# Patient Record
Sex: FEMALE | Race: WHITE | NOT HISPANIC OR LATINO | URBAN - METROPOLITAN AREA
[De-identification: names, ages, dates, MRNs, and addresses within clinical notes are randomized per-mention and may not be internally consistent; named-entity substitution may affect disease eponyms.]

---

## 2017-09-21 ENCOUNTER — OUTPATIENT (OUTPATIENT)
Dept: OUTPATIENT SERVICES | Facility: HOSPITAL | Age: 62
LOS: 1 days | Discharge: HOME | End: 2017-09-21

## 2017-09-21 DIAGNOSIS — G43.909 MIGRAINE, UNSPECIFIED, NOT INTRACTABLE, WITHOUT STATUS MIGRAINOSUS: ICD-10-CM

## 2017-09-21 DIAGNOSIS — F41.9 ANXIETY DISORDER, UNSPECIFIED: ICD-10-CM

## 2017-09-21 DIAGNOSIS — J44.9 CHRONIC OBSTRUCTIVE PULMONARY DISEASE, UNSPECIFIED: ICD-10-CM

## 2017-09-21 DIAGNOSIS — J30.9 ALLERGIC RHINITIS, UNSPECIFIED: ICD-10-CM

## 2017-09-21 DIAGNOSIS — K21.9 GASTRO-ESOPHAGEAL REFLUX DISEASE WITHOUT ESOPHAGITIS: ICD-10-CM

## 2017-11-15 ENCOUNTER — OUTPATIENT (OUTPATIENT)
Dept: OUTPATIENT SERVICES | Facility: HOSPITAL | Age: 62
LOS: 1 days | Discharge: HOME | End: 2017-11-15

## 2018-05-29 ENCOUNTER — OUTPATIENT (OUTPATIENT)
Dept: OUTPATIENT SERVICES | Facility: HOSPITAL | Age: 63
LOS: 1 days | Discharge: HOME | End: 2018-05-29

## 2018-05-29 DIAGNOSIS — M85.80 OTHER SPECIFIED DISORDERS OF BONE DENSITY AND STRUCTURE, UNSPECIFIED SITE: ICD-10-CM

## 2018-05-29 DIAGNOSIS — J30.9 ALLERGIC RHINITIS, UNSPECIFIED: ICD-10-CM

## 2018-05-29 DIAGNOSIS — J44.9 CHRONIC OBSTRUCTIVE PULMONARY DISEASE, UNSPECIFIED: ICD-10-CM

## 2018-05-29 DIAGNOSIS — K21.9 GASTRO-ESOPHAGEAL REFLUX DISEASE WITHOUT ESOPHAGITIS: ICD-10-CM

## 2019-06-12 ENCOUNTER — OUTPATIENT (OUTPATIENT)
Dept: OUTPATIENT SERVICES | Facility: HOSPITAL | Age: 64
LOS: 1 days | Discharge: HOME | End: 2019-06-12

## 2019-06-12 DIAGNOSIS — F43.23 ADJUSTMENT DISORDER WITH MIXED ANXIETY AND DEPRESSED MOOD: ICD-10-CM

## 2019-06-12 DIAGNOSIS — J30.9 ALLERGIC RHINITIS, UNSPECIFIED: ICD-10-CM

## 2019-06-12 DIAGNOSIS — J44.9 CHRONIC OBSTRUCTIVE PULMONARY DISEASE, UNSPECIFIED: ICD-10-CM

## 2019-06-12 DIAGNOSIS — G43.909 MIGRAINE, UNSPECIFIED, NOT INTRACTABLE, WITHOUT STATUS MIGRAINOSUS: ICD-10-CM

## 2019-06-12 DIAGNOSIS — K21.9 GASTRO-ESOPHAGEAL REFLUX DISEASE WITHOUT ESOPHAGITIS: ICD-10-CM

## 2021-04-15 PROBLEM — Z00.00 ENCOUNTER FOR PREVENTIVE HEALTH EXAMINATION: Status: ACTIVE | Noted: 2021-04-15

## 2021-05-11 ENCOUNTER — INPATIENT (INPATIENT)
Facility: HOSPITAL | Age: 66
LOS: 1 days | Discharge: HOME | End: 2021-05-13
Attending: STUDENT IN AN ORGANIZED HEALTH CARE EDUCATION/TRAINING PROGRAM | Admitting: STUDENT IN AN ORGANIZED HEALTH CARE EDUCATION/TRAINING PROGRAM
Payer: MEDICARE

## 2021-05-11 VITALS
SYSTOLIC BLOOD PRESSURE: 133 MMHG | DIASTOLIC BLOOD PRESSURE: 75 MMHG | HEIGHT: 67 IN | HEART RATE: 95 BPM | OXYGEN SATURATION: 96 % | RESPIRATION RATE: 18 BRPM | WEIGHT: 128.09 LBS | TEMPERATURE: 98 F

## 2021-05-11 LAB
ALBUMIN SERPL ELPH-MCNC: 4.4 G/DL — SIGNIFICANT CHANGE UP (ref 3.5–5.2)
ALP SERPL-CCNC: 159 U/L — HIGH (ref 30–115)
ALT FLD-CCNC: 15 U/L — SIGNIFICANT CHANGE UP (ref 0–41)
ANION GAP SERPL CALC-SCNC: 11 MMOL/L — SIGNIFICANT CHANGE UP (ref 7–14)
APTT BLD: 39 SEC — SIGNIFICANT CHANGE UP (ref 27–39.2)
AST SERPL-CCNC: 31 U/L — SIGNIFICANT CHANGE UP (ref 0–41)
BASOPHILS # BLD AUTO: 0.06 K/UL — SIGNIFICANT CHANGE UP (ref 0–0.2)
BASOPHILS NFR BLD AUTO: 0.6 % — SIGNIFICANT CHANGE UP (ref 0–1)
BILIRUB SERPL-MCNC: 0.2 MG/DL — SIGNIFICANT CHANGE UP (ref 0.2–1.2)
BUN SERPL-MCNC: 23 MG/DL — HIGH (ref 10–20)
CALCIUM SERPL-MCNC: 9.8 MG/DL — SIGNIFICANT CHANGE UP (ref 8.5–10.1)
CHLORIDE SERPL-SCNC: 102 MMOL/L — SIGNIFICANT CHANGE UP (ref 98–110)
CO2 SERPL-SCNC: 24 MMOL/L — SIGNIFICANT CHANGE UP (ref 17–32)
CREAT SERPL-MCNC: 1.3 MG/DL — SIGNIFICANT CHANGE UP (ref 0.7–1.5)
EOSINOPHIL # BLD AUTO: 0.12 K/UL — SIGNIFICANT CHANGE UP (ref 0–0.7)
EOSINOPHIL NFR BLD AUTO: 1.2 % — SIGNIFICANT CHANGE UP (ref 0–8)
GLUCOSE SERPL-MCNC: 98 MG/DL — SIGNIFICANT CHANGE UP (ref 70–99)
HCT VFR BLD CALC: 39.5 % — SIGNIFICANT CHANGE UP (ref 37–47)
HGB BLD-MCNC: 12.7 G/DL — SIGNIFICANT CHANGE UP (ref 12–16)
IMM GRANULOCYTES NFR BLD AUTO: 0.4 % — HIGH (ref 0.1–0.3)
INR BLD: 1.11 RATIO — SIGNIFICANT CHANGE UP (ref 0.65–1.3)
LYMPHOCYTES # BLD AUTO: 1.69 K/UL — SIGNIFICANT CHANGE UP (ref 1.2–3.4)
LYMPHOCYTES # BLD AUTO: 16.8 % — LOW (ref 20.5–51.1)
MCHC RBC-ENTMCNC: 27.8 PG — SIGNIFICANT CHANGE UP (ref 27–31)
MCHC RBC-ENTMCNC: 32.2 G/DL — SIGNIFICANT CHANGE UP (ref 32–37)
MCV RBC AUTO: 86.4 FL — SIGNIFICANT CHANGE UP (ref 81–99)
MONOCYTES # BLD AUTO: 0.92 K/UL — HIGH (ref 0.1–0.6)
MONOCYTES NFR BLD AUTO: 9.1 % — SIGNIFICANT CHANGE UP (ref 1.7–9.3)
NEUTROPHILS # BLD AUTO: 7.23 K/UL — HIGH (ref 1.4–6.5)
NEUTROPHILS NFR BLD AUTO: 71.9 % — SIGNIFICANT CHANGE UP (ref 42.2–75.2)
NRBC # BLD: 0 /100 WBCS — SIGNIFICANT CHANGE UP (ref 0–0)
PLATELET # BLD AUTO: 286 K/UL — SIGNIFICANT CHANGE UP (ref 130–400)
POTASSIUM SERPL-MCNC: 4.5 MMOL/L — SIGNIFICANT CHANGE UP (ref 3.5–5)
POTASSIUM SERPL-SCNC: 4.5 MMOL/L — SIGNIFICANT CHANGE UP (ref 3.5–5)
PROT SERPL-MCNC: 7.5 G/DL — SIGNIFICANT CHANGE UP (ref 6–8)
PROTHROM AB SERPL-ACNC: 12.8 SEC — SIGNIFICANT CHANGE UP (ref 9.95–12.87)
RAPID RVP RESULT: SIGNIFICANT CHANGE UP
RBC # BLD: 4.57 M/UL — SIGNIFICANT CHANGE UP (ref 4.2–5.4)
RBC # FLD: 14.7 % — HIGH (ref 11.5–14.5)
SARS-COV-2 RNA SPEC QL NAA+PROBE: SIGNIFICANT CHANGE UP
SODIUM SERPL-SCNC: 137 MMOL/L — SIGNIFICANT CHANGE UP (ref 135–146)
TROPONIN T SERPL-MCNC: <0.01 NG/ML — SIGNIFICANT CHANGE UP
WBC # BLD: 10.06 K/UL — SIGNIFICANT CHANGE UP (ref 4.8–10.8)
WBC # FLD AUTO: 10.06 K/UL — SIGNIFICANT CHANGE UP (ref 4.8–10.8)

## 2021-05-11 PROCEDURE — 70498 CT ANGIOGRAPHY NECK: CPT | Mod: 26,MA

## 2021-05-11 PROCEDURE — 70496 CT ANGIOGRAPHY HEAD: CPT | Mod: 26,MA

## 2021-05-11 PROCEDURE — 93010 ELECTROCARDIOGRAM REPORT: CPT

## 2021-05-11 PROCEDURE — 0042T: CPT

## 2021-05-11 PROCEDURE — 99221 1ST HOSP IP/OBS SF/LOW 40: CPT

## 2021-05-11 PROCEDURE — 99285 EMERGENCY DEPT VISIT HI MDM: CPT

## 2021-05-11 PROCEDURE — 99223 1ST HOSP IP/OBS HIGH 75: CPT

## 2021-05-11 PROCEDURE — 70450 CT HEAD/BRAIN W/O DYE: CPT | Mod: 26,MA,59

## 2021-05-11 RX ORDER — FLUTICASONE PROPIONATE 50 MCG
0 SPRAY, SUSPENSION NASAL
Qty: 0 | Refills: 0 | DISCHARGE

## 2021-05-11 RX ORDER — PANTOPRAZOLE SODIUM 20 MG/1
40 TABLET, DELAYED RELEASE ORAL
Refills: 0 | Status: DISCONTINUED | OUTPATIENT
Start: 2021-05-11 | End: 2021-05-13

## 2021-05-11 RX ORDER — FLUTICASONE PROPIONATE 50 MCG
2 SPRAY, SUSPENSION NASAL
Qty: 0 | Refills: 0 | DISCHARGE

## 2021-05-11 RX ORDER — DIPHENHYDRAMINE HCL 50 MG
50 CAPSULE ORAL ONCE
Refills: 0 | Status: COMPLETED | OUTPATIENT
Start: 2021-05-11 | End: 2021-05-11

## 2021-05-11 RX ORDER — NICOTINE POLACRILEX 2 MG
1 GUM BUCCAL DAILY
Refills: 0 | Status: DISCONTINUED | OUTPATIENT
Start: 2021-05-11 | End: 2021-05-13

## 2021-05-11 RX ORDER — ESOMEPRAZOLE MAGNESIUM 40 MG/1
1 CAPSULE, DELAYED RELEASE ORAL
Qty: 0 | Refills: 0 | DISCHARGE

## 2021-05-11 RX ORDER — ASPIRIN/CALCIUM CARB/MAGNESIUM 324 MG
1 TABLET ORAL
Qty: 0 | Refills: 0 | DISCHARGE

## 2021-05-11 RX ORDER — ALPRAZOLAM 0.25 MG
1 TABLET ORAL
Qty: 0 | Refills: 0 | DISCHARGE

## 2021-05-11 RX ORDER — ESOMEPRAZOLE MAGNESIUM 40 MG/1
0 CAPSULE, DELAYED RELEASE ORAL
Qty: 0 | Refills: 0 | DISCHARGE

## 2021-05-11 RX ORDER — CHLORHEXIDINE GLUCONATE 213 G/1000ML
1 SOLUTION TOPICAL
Refills: 0 | Status: DISCONTINUED | OUTPATIENT
Start: 2021-05-11 | End: 2021-05-13

## 2021-05-11 RX ORDER — ATORVASTATIN CALCIUM 80 MG/1
80 TABLET, FILM COATED ORAL AT BEDTIME
Refills: 0 | Status: DISCONTINUED | OUTPATIENT
Start: 2021-05-11 | End: 2021-05-13

## 2021-05-11 RX ORDER — NEFAZODONE HYDROCHLORIDE 200 MG/1
0 TABLET ORAL
Qty: 0 | Refills: 0 | DISCHARGE

## 2021-05-11 RX ORDER — ASPIRIN/CALCIUM CARB/MAGNESIUM 324 MG
81 TABLET ORAL DAILY
Refills: 0 | Status: DISCONTINUED | OUTPATIENT
Start: 2021-05-11 | End: 2021-05-13

## 2021-05-11 RX ORDER — ASPIRIN/CALCIUM CARB/MAGNESIUM 324 MG
81 TABLET ORAL DAILY
Refills: 0 | Status: DISCONTINUED | OUTPATIENT
Start: 2021-05-11 | End: 2021-05-12

## 2021-05-11 RX ORDER — ALPRAZOLAM 0.25 MG
0 TABLET ORAL
Qty: 0 | Refills: 0 | DISCHARGE

## 2021-05-11 RX ORDER — ALPRAZOLAM 0.25 MG
1 TABLET ORAL
Refills: 0 | Status: DISCONTINUED | OUTPATIENT
Start: 2021-05-11 | End: 2021-05-11

## 2021-05-11 RX ORDER — ALPRAZOLAM 0.25 MG
1 TABLET ORAL
Refills: 0 | Status: DISCONTINUED | OUTPATIENT
Start: 2021-05-11 | End: 2021-05-13

## 2021-05-11 RX ADMIN — Medication 125 MILLIGRAM(S): at 11:50

## 2021-05-11 RX ADMIN — Medication 1 MILLIGRAM(S): at 17:26

## 2021-05-11 RX ADMIN — Medication 1 MILLIGRAM(S): at 23:26

## 2021-05-11 RX ADMIN — Medication 50 MILLIGRAM(S): at 11:50

## 2021-05-11 NOTE — STROKE CODE NOTE - IV ALTEPLASE EXCLUSION ABS OTHER
Last time well unclear,  and patient report her not feeling well the previous day. Not a candidate for IV thrombolytics

## 2021-05-11 NOTE — H&P ADULT - ASSESSMENT
65 year old woman with PMH of COPD (not on any medication),Active smoker, Stable pulmonary nodules, Anxiety Depression, CVA 5 years ago residula LLE weakness,  presents with increased from residual left sided weakness slurred speech, sudden loss of coordination, fall x 2 episodes with head injury.  As per patient and family  she  recently found out that she  was being moved to another living location and became very depressed and felt the onset of an anxiety attach.  Patient  states that she then took and unspecified about of 1mg xanax pills. This  was followed  by  a sudden felling of lethargy.  She then tried t get out of bed felt really weak and unsteady.  She doesn't remember much after this point. When  her family found her she was slurring her words, very weak  and had a bruise on her head.    Stroke code on arrival. NIHSS 5 for generalized weakness and baseline left sided numbness. Patient Denies  Suicidal Ideation, chest  Pain , Palpitations.       IMPRESSION/Plan   Unwitnessed Fall  with head Trauma  in the setting on  Toxic Encephalopathy ( Xanax Overuse) Syncope   >Symtoms of lethargy and slurred speech are like side effects of medication over use  >Tapered  Xanax  to  1mg q12 instead of home doing of 1mg q4  > Patients States that she sees a Psyc Doctor as OP but did not tolerated  any other Anxiolytics  Well   > f/u Inpatient Psych  R/o Acute CVA  at baseline patient has  Residual  LLE Weakanes and Gait instability   > Possible component of strock  Unmasking in the  Setting of Xanax overdose   > CT imaging unremarkable for Acute Infarct    > f/u MRI and Neurology   > continue  Aspiring   > f/u  Lipid pnael   > EEG r/u  Seizures   > f/u echo   > ECG NSR  > further recs as per Neuro   > Pt/Rehab   HO COPD stable  with emphysematous changes not on CT imaging   > Patient has regular OP CT  screening scan for subcentimeter nodules   Active  Smoking   > Nicotine Patch   > Smoking cessation encouragement       Electrolyte Imbalances: Correct as needed  []  Hyponatremia   /   Hypernatremia  []   []  Hypokalemia   /   Hyperkalemia  []   []  Hypochlorhydria   /    Hypochlorhydria  []   []  Hypomagnesemia   /   Hypermagnesemia  []   []  Hypophosphatemia   /   Hyperphosphatemia  []       GI ppx:                                   [] Not indicated   /   [x] Pantoprazole 40mg PO Daily    DVT ppx:  [] Not indicated / [] Heparin 5000mg SubQ / [x] Lovenox 40mg SubQ / [] SCDs    Fluids:   [x] PO  |  [] IVF    Activity:  [X] Assisatnce needed   [X] Increase as Tolerated  /  [] OOB w/ assist  /  [] Bed Rest    BMI:      DISPO:  Patient to be discharged when condition(s) optimized.  [x ] From Home     [ ] NH/SNF   [ ] 4A Rehab  [ ] Detox Clinic  [X] Plan Discussion with patient and/or family.  [X] Discussed Case and Plan with the Medical Attending.    CODE STATUS  [X] FULL   /    [] DNR

## 2021-05-11 NOTE — H&P ADULT - NSHPREVIEWOFSYSTEMS_GEN_ALL_CORE
REVIEW OF SYSTEMS:  CONSTITUTIONAL: No fever, weight loss, or fatigue  EYES: No eye pain, visual disturbances, or discharge  ENMT:  No difficulty hearing, tinnitus, vertigo; No sinus or throat pain  RESPIRATORY: No cough, wheezing, chills or hemoptysis; No shortness of breath  CARDIOVASCULAR: No chest pain, palpitations, dizziness, or leg swelling  GASTROINTESTINAL: No abdominal or epigastric pain.No diarrhea or constipation.   GENITOURINARY: No dysuria, frequency, hematuria, or incontinence  NEUROLOGICAL: No headaches, memory loss, loss of strength, numbness, or tremors  MUSCULOSKELETAL: No joint pain or swelling; No muscle, back, or extremity pain  PSYCHIATRIC: No depression, anxiety, mood swings, or difficulty sleeping

## 2021-05-11 NOTE — ED PROVIDER NOTE - CLINICAL SUMMARY MEDICAL DECISION MAKING FREE TEXT BOX
65 y.o. female, PMH of COPD (not on any medication), Active smoker, Stable pulmonary nodules, Anxiety Depression, CVA 5 years ago residual LLE weakness, comes in c/o worsening left sided weakness, slurred speech, sudden loss of coordination, fall x 2 episodes with head injury. Patient states that her anxiety got worse and she took her Xanax, felt lethargic, tried to get out of bed, felt really weak and unsteady. She doesn't remember much after this point. When  her family found her she was slurring her words, was very weak  and had a bruise on her head. Stroke code called in triage. On exam, pt in NAD, AAOx3, slurred speech, head (+) abrasion to left forehead, PEERL, EOMi, CN II-XII intact, lungs CTA B/L, CV S1S2 regular, abdomen soft/NT/ND/(+)BS, ext (+) bruise to right knee, Motor 5/5, sensation intact except left UE/LE subjective numbness, finger to nose intact. Labs/CT done and reviewed. WIll admit.

## 2021-05-11 NOTE — H&P ADULT - ATTENDING COMMENTS
HPI:  65 year old woman with PMH of stroke presents with increased from residual left sided weakness, sudden loss of coordination, fall x 2 episodes with head injury. Stroke code on arrival. NIHSS 5 for generalized weakness and baseline left sided numbness. Not a candidate for IV thrombolytics for being out of the window. Last known well is not clear.         REVIEW OF SYSTEMS:  CONSTITUTIONAL:  No weakness, fevers, chills, night sweats, weight loss  EYES/ENT: No visual changes. No vertigo or dysphagia  NECK: No neck pain or stiffness  RESPIRATORY: No cough, wheezing, hemoptysis. No shortness of breath  CARDIOVASCULAR: No chest pain or palpitations. No lower extremity edema  GASTROINTESTINAL: No abdominal pain. No nausea, vomiting, diarrhea, or hematemesis  GENITOURINARY: No dysuria or hematuria   NEUROLOGICAL: as above   SKIN: No rashes or itching  HEMATOLOGIC: No easy bruising or prolonged bleeding.      PHYSICAL EXAM:  GENERAL: NAD, well-developed, Non-toxic, stated age   HEAD:  Atraumatic, Normocephalic  EYES: EOMI, Sclera White   NECK: Supple, No JVD  CHEST/LUNG: Clear to auscultation bilaterally; No wheezing, rhonchi, or crackles  HEART: Regular rate and rhythm; s1, s2, No murmurs, rubs, or gallops  ABDOMEN: Soft, Nontender, Nondistended; Bowel sounds present, No rebound or guarding noted   EXTREMITIES:  No lower extremity edema or calf tenderness to palpation.  No clubbing or cyanosis  PSYCH: AAOx3, pleasant, cooperative, not anxious  NEUROLOGY: A/O (MRC grade R/L) 4+/5 UE; 4+/5 LE.   SKIN: No rashes or lesions      ASSESSMENT AND PLAN:  S/P  Fall with head Trauma  2' Toxic Encephalopathy ( Xanax Overuse)   Tapered  Xanax  to  1mg q12 instead of home doing of 1mg q4    R/o Acute CVA  at baseline    CT imaging unremarkable for Acute Infarct     MRI and Neurology    f/u  Lipid pnael    EEG r/u  Seizures   f/u echo     further recs as per Neuro    Pt/Rehab       My note supersedes the residents in the event of a discrepancy.

## 2021-05-11 NOTE — ED ADULT TRIAGE NOTE - CHIEF COMPLAINT QUOTE
c/o increased left sided weakness, sudden loss of coordination, fall x 2 episode, +head injury, denies LOC/blood thinners, abrasion to left forehead, and slurred speech, FS 96 in triage

## 2021-05-11 NOTE — ED PROVIDER NOTE - NS ED ROS FT
Eyes:  No visual changes, eye pain or discharge.  ENMT:  No hearing changes, pain, discharge or infections. No neck pain or stiffness.  Cardiac:  No chest pain, SOB or edema. No chest pain with exertion.  Respiratory:  No cough or respiratory distress. No hemoptysis. No history of asthma or RAD.  GI:  No nausea, vomiting, diarrhea or abdominal pain.  :  No dysuria, frequency or burning.  MS:  No myalgia, muscle weakness, joint pain or back pain.  Neuro:  No headache. +weakness. No LOC.  Skin:  No skin rash.

## 2021-05-11 NOTE — ED ADULT NURSE REASSESSMENT NOTE - NS ED NURSE REASSESS COMMENT FT1
Pt reassessed. NIH 4. Pt more alert and less dysarthric. VSS. Cardiac monitoring continued. brother at bedside. will continue to monitor.

## 2021-05-11 NOTE — CONSULT NOTE ADULT - ASSESSMENT
Impression:  65 year old woman with PMH of stroke presents with increased from residual left sided weakness, sudden loss of coordination, fall x 2 episodes with head injury. Stroke code on arrival. NIHSS 5 for generalized weakness and baseline left sided numbness. Not a candidate for IV thrombolytics for being out of the window. Last known well is not clear.   Not a candidate for IA intervention, no LVO on CTA    Suggestion:  -Patient with recent change in psychiatric medication, could be contributing factor in dysarthria and generalized weakness.   -Can get MRI brain without petra  -Medical management of depression meds.   -Avoid dehydration, hypotension, hypovolemia.   -Smoking cessation, medical management of lung abnormality on CTA.   -Can get routine EEG    Marcial Chamberlain NP  [id number]

## 2021-05-11 NOTE — ED PROVIDER NOTE - PHYSICAL EXAMINATION
CONSTITUTIONAL: Well-developed; well-nourished; in no acute distress.   SKIN: warm, dry  HEAD: Normocephalic; atraumatic.  EYES: PERRL, EOMI, no conjunctival erythema  ENT: No nasal discharge; airway clear.  NECK: Supple; non tender.  CARD: S1, S2 normal; no murmurs, gallops, or rubs. Regular rate and rhythm.   RESP: No wheezes, rales or rhonchi.  ABD: soft ntnd  EXT: Normal ROM.  No clubbing, cyanosis or edema.   LYMPH: No acute cervical adenopathy.  NEURO: Alert, oriented, CNs 2-12 intact. +slightly decreased sensation to LUE.  4/5 motor strength to b/l upper and lower extremities.  PSYCH: Cooperative, appropriate. CONSTITUTIONAL: Well-developed; well-nourished; in no acute distress.   SKIN: warm, dry.  +bruising to R knee and left forehead/+small abrasion to L forehead.  HEAD: Normocephalic; atraumatic.  EYES: PERRL, EOMI, no conjunctival erythema  ENT: No nasal discharge; airway clear.  NECK: Supple; non tender.  CARD: S1, S2 normal; no murmurs, gallops, or rubs. Regular rate and rhythm.   RESP: No wheezes, rales or rhonchi.  ABD: soft ntnd  EXT: Normal ROM.  No clubbing, cyanosis or edema.   LYMPH: No acute cervical adenopathy.  NEURO: Alert, oriented, CNs 2-12 intact. +slightly decreased sensation to LUE.  4/5 motor strength to b/l upper and lower extremities.  PSYCH: Cooperative, appropriate.

## 2021-05-11 NOTE — CONSULT NOTE ADULT - ATTENDING COMMENTS
I have personally seen and examined this patient in the ED with ACP.  I have fully participated in the care of this patient.  I have reviewed all pertinent clinical information, including history, physical exam, plan and note. Patient had two falls and head trauma yesterday. Noted to have slurred speech and general weakness this morning. Last known well unclear. On exam has slight right sided weakness. No tpa candidate. CTA and CTP were unremarkable. Could obtain MRI brain and REEG. Medical and psych management per primary team.   I have reviewed all pertinent clinical information and reviewed all relevant imaging and diagnostic studies personally.  Recommendations as above.  Agree with above assessment except as noted.

## 2021-05-11 NOTE — ED PROVIDER NOTE - OBJECTIVE STATEMENT
64 y/o F with pmh of stroke 5 years ago, anxiety, depresison, copd, current smoker presenting to the ED today for weakness. Pt. presenting today with increased residual L sided weakness, sudden loss of coordination, falls x2 w/ head injury. Denies LOC, not on any AC. Sustained abrasion to L forehead. Last known well time is unknown. Stroke code called upon arrival to the ED. Pt. denies cp, sob, abdominal pain, dizziness, n/v, headache, vision changes, or fever/chills.

## 2021-05-11 NOTE — CONSULT NOTE ADULT - SUBJECTIVE AND OBJECTIVE BOX
Neurology Consult    Patient is a 65y old  Female who presents with a chief complaint of generalized weakness    HPI:  65 year old woman with PMH of stroke presents with increased from residual left sided weakness, sudden loss of coordination, fall x 2 episodes with head injury. Stroke code on arrival. NIHSS 5 for generalized weakness and baseline left sided numbness.       PAST MEDICAL & SURGICAL HISTORY:      FAMILY HISTORY:      Social History: (-) x 3    Allergies    codeine (Unknown)    Intolerances        MEDICATIONS  (STANDING):  diphenhydrAMINE   Injectable 50 milliGRAM(s) IV Push Once    MEDICATIONS  (PRN):    Vital Signs Last 24 Hrs  T(C): 36.7 (11 May 2021 11:50), Max: 36.7 (11 May 2021 11:50)  T(F): 98.1 (11 May 2021 11:50), Max: 98.1 (11 May 2021 11:50)  HR: 95 (11 May 2021 11:50) (95 - 95)  BP: 133/75 (11 May 2021 11:50) (133/75 - 133/75)  BP(mean): --  RR: 18 (11 May 2021 11:50) (18 - 18)  SpO2: 96% (11 May 2021 11:50) (96% - 96%)    Examination:  General:  Appearance is consistent with chronologic age.  No abnormal facies.  Gross skin survey within normal limits.    Cognitive/Language:  The patient is oriented to person, place, time and date.  Recent and remote memory intact.  Fund of knowledge is intact and normal.  Language with normal repetition, comprehension and naming.  Mildly dysarthric.    Eyes: intact VA, VFF.  EOMI w/o nystagmus, skew or reported double vision.  PERRL.  No ptosis/weakness of eyelid closure.    Face:  Facial sensation normal V1 - 3, no facial asymmetry.    Ears/Nose/Throat:  Hearing grossly intact b/l.  Palate elevates midline.  Tongue and uvula midline.   Motor examination:   Normal tone, bulk and range of motion.  No tenderness, twitching, tremors or involuntary movements.  Formal Muscle Strength Testing: (MRC grade R/L) 4+/5 UE; 4+/5 LE.    Sensory examination:   Intact to light touch and pinprick, pain, temperature and proprioception and vibration in all extremities. Except left UE LE subjective numbness  Cerebellum:   FTN/HKS intact with normal NIKHIL in all limbs.  No dysmetria or dysdiadokinesia.  Gait deferred    NIHSS 5     Labs:   CBC Full  -  ( 11 May 2021 12:12 )  WBC Count : 10.06 K/uL  RBC Count : 4.57 M/uL  Hemoglobin : 12.7 g/dL  Hematocrit : 39.5 %  Platelet Count - Automated : 286 K/uL  Mean Cell Volume : 86.4 fL  Mean Cell Hemoglobin : 27.8 pg  Mean Cell Hemoglobin Concentration : 32.2 g/dL  Auto Neutrophil # : 7.23 K/uL  Auto Lymphocyte # : 1.69 K/uL  Auto Monocyte # : 0.92 K/uL  Auto Eosinophil # : 0.12 K/uL  Auto Basophil # : 0.06 K/uL  Auto Neutrophil % : 71.9 %  Auto Lymphocyte % : 16.8 %  Auto Monocyte % : 9.1 %  Auto Eosinophil % : 1.2 %  Auto Basophil % : 0.6 %                    Neuroimaging:  NCHCT:   < from: CT Brain Stroke Protocol (05.11.21 @ 11:55) >  IMPRESSION:    No acute intracranial pathology.    Mild chronic microvascular ischemic changes.      < end of copied text >    05-11-21 @ 12:41       Neurology Consult    Patient is a 65y old  Female who presents with a chief complaint of generalized weakness and slurred speech     HPI:  65 year old woman with PMH of stroke presents with increased from residual left sided weakness, sudden loss of coordination, fall x 2 episodes with head injury. Stroke code on arrival. NIHSS 5 for generalized weakness and baseline left sided numbness.       PAST MEDICAL & SURGICAL HISTORY:      FAMILY HISTORY:      Social History: (-) x 3    Allergies    codeine (Unknown)    Intolerances        MEDICATIONS  (STANDING):  diphenhydrAMINE   Injectable 50 milliGRAM(s) IV Push Once    MEDICATIONS  (PRN):    Vital Signs Last 24 Hrs  T(C): 36.7 (11 May 2021 11:50), Max: 36.7 (11 May 2021 11:50)  T(F): 98.1 (11 May 2021 11:50), Max: 98.1 (11 May 2021 11:50)  HR: 95 (11 May 2021 11:50) (95 - 95)  BP: 133/75 (11 May 2021 11:50) (133/75 - 133/75)  BP(mean): --  RR: 18 (11 May 2021 11:50) (18 - 18)  SpO2: 96% (11 May 2021 11:50) (96% - 96%)    Examination:  General:  Appearance is consistent with chronologic age.  No abnormal facies.  Gross skin survey within normal limits.    Cognitive/Language:  The patient is oriented to person, place, time and date.  Recent and remote memory intact.  Fund of knowledge is intact and normal.  Language with normal repetition, comprehension and naming.  Mildly dysarthric.    Eyes: intact VA, VFF.  EOMI w/o nystagmus, skew or reported double vision.  PERRL.  No ptosis/weakness of eyelid closure.    Face:  Facial sensation normal V1 - 3, no facial asymmetry.    Ears/Nose/Throat:  Hearing grossly intact b/l.  Palate elevates midline.  Tongue and uvula midline.   Motor examination:   Normal tone, bulk and range of motion.  No tenderness, twitching, tremors or involuntary movements.  Formal Muscle Strength Testing: (MRC grade R/L) 4+/5 UE; 4+/5 LE.    Sensory examination:   Intact to light touch and pinprick, pain, temperature and proprioception and vibration in all extremities. Except left UE LE subjective numbness  Cerebellum:   FTN/HKS intact with normal NIKHIL in all limbs.  No dysmetria or dysdiadokinesia.  Gait deferred    NIHSS 5     Labs:   CBC Full  -  ( 11 May 2021 12:12 )  WBC Count : 10.06 K/uL  RBC Count : 4.57 M/uL  Hemoglobin : 12.7 g/dL  Hematocrit : 39.5 %  Platelet Count - Automated : 286 K/uL  Mean Cell Volume : 86.4 fL  Mean Cell Hemoglobin : 27.8 pg  Mean Cell Hemoglobin Concentration : 32.2 g/dL  Auto Neutrophil # : 7.23 K/uL  Auto Lymphocyte # : 1.69 K/uL  Auto Monocyte # : 0.92 K/uL  Auto Eosinophil # : 0.12 K/uL  Auto Basophil # : 0.06 K/uL  Auto Neutrophil % : 71.9 %  Auto Lymphocyte % : 16.8 %  Auto Monocyte % : 9.1 %  Auto Eosinophil % : 1.2 %  Auto Basophil % : 0.6 %                    Neuroimaging:  NCHCT:   < from: CT Brain Stroke Protocol (05.11.21 @ 11:55) >  IMPRESSION:    No acute intracranial pathology.    Mild chronic microvascular ischemic changes.      < end of copied text >    05-11-21 @ 12:41

## 2021-05-11 NOTE — STROKE CODE NOTE - INITIAL PRESENTATION
"Chief Complaint   Patient presents with     Cough       Initial BP (!) 136/97  Pulse 83  Temp 97.6  F (36.4  C) (Tympanic)  Wt 202 lb 6.4 oz (91.8 kg)  SpO2 96%  BMI 40.88 kg/m2 Estimated body mass index is 40.88 kg/(m^2) as calculated from the following:    Height as of 9/15/16: 4' 11\" (1.499 m).    Weight as of this encounter: 202 lb 6.4 oz (91.8 kg).  Medication Reconciliation: complete   Patient was masked while I roomed her.    Federica Vo MA      "
ED

## 2021-05-11 NOTE — ED ADULT NURSE NOTE - OBJECTIVE STATEMENT
Pt is a 65yr old female from home c/o left sided weakness and slurred speech since 0100. As per brother, pt had 2 falls recently and presented to ed with ecchymosis and swelling to left forehead. Pt on ASA. Pt +smoker, allergies to iv contrast reaction hives.

## 2021-05-11 NOTE — H&P ADULT - HISTORY OF PRESENT ILLNESS
65 year old woman with PMH of COPD (not on any medication),Active smoker, Stable pulmonary nodules, Anxiety Depression, CVA 5 years ago residula LLE weakness,  presents with increased from residual left sided weakness slurred speech, sudden loss of coordination, fall x 2 episodes with head injury.  As per patient and family  she  recently found out that she  was being moved to another living location and became very depressed and felt the onset of an anxiety attach.  Patient  states that she then took and unspecified about of 1mg xanax pills. This  was followed  by  a sudden felling of lethargy.  She then tried t get out of bed felt really weak and unsteady.  She doesn't remember much after this point. When  her family found her she was slurring her words, very weak  and had a bruise on her head.    Stroke code on arrival. NIHSS 5 for generalized weakness and baseline left sided numbness. Patient Denies  Suicidal Ideation, chest  Pain , Palpitations.

## 2021-05-12 LAB
ALBUMIN SERPL ELPH-MCNC: 4.2 G/DL — SIGNIFICANT CHANGE UP (ref 3.5–5.2)
ALP SERPL-CCNC: 144 U/L — HIGH (ref 30–115)
ALT FLD-CCNC: 14 U/L — SIGNIFICANT CHANGE UP (ref 0–41)
ANION GAP SERPL CALC-SCNC: 14 MMOL/L — SIGNIFICANT CHANGE UP (ref 7–14)
AST SERPL-CCNC: 27 U/L — SIGNIFICANT CHANGE UP (ref 0–41)
BASOPHILS # BLD AUTO: 0.05 K/UL — SIGNIFICANT CHANGE UP (ref 0–0.2)
BASOPHILS NFR BLD AUTO: 0.4 % — SIGNIFICANT CHANGE UP (ref 0–1)
BILIRUB SERPL-MCNC: 0.3 MG/DL — SIGNIFICANT CHANGE UP (ref 0.2–1.2)
BUN SERPL-MCNC: 20 MG/DL — SIGNIFICANT CHANGE UP (ref 10–20)
CALCIUM SERPL-MCNC: 9.6 MG/DL — SIGNIFICANT CHANGE UP (ref 8.5–10.1)
CHLORIDE SERPL-SCNC: 105 MMOL/L — SIGNIFICANT CHANGE UP (ref 98–110)
CHOLEST SERPL-MCNC: 221 MG/DL — HIGH
CO2 SERPL-SCNC: 21 MMOL/L — SIGNIFICANT CHANGE UP (ref 17–32)
CREAT SERPL-MCNC: 1.2 MG/DL — SIGNIFICANT CHANGE UP (ref 0.7–1.5)
EOSINOPHIL # BLD AUTO: 0.15 K/UL — SIGNIFICANT CHANGE UP (ref 0–0.7)
EOSINOPHIL NFR BLD AUTO: 1.3 % — SIGNIFICANT CHANGE UP (ref 0–8)
GLUCOSE SERPL-MCNC: 98 MG/DL — SIGNIFICANT CHANGE UP (ref 70–99)
HCT VFR BLD CALC: 38.5 % — SIGNIFICANT CHANGE UP (ref 37–47)
HCV AB S/CO SERPL IA: 0.03 COI — SIGNIFICANT CHANGE UP
HCV AB SERPL-IMP: SIGNIFICANT CHANGE UP
HDLC SERPL-MCNC: 70 MG/DL — SIGNIFICANT CHANGE UP
HGB BLD-MCNC: 12.6 G/DL — SIGNIFICANT CHANGE UP (ref 12–16)
IMM GRANULOCYTES NFR BLD AUTO: 0.3 % — SIGNIFICANT CHANGE UP (ref 0.1–0.3)
LIPID PNL WITH DIRECT LDL SERPL: 131 MG/DL — HIGH
LYMPHOCYTES # BLD AUTO: 28.8 % — SIGNIFICANT CHANGE UP (ref 20.5–51.1)
LYMPHOCYTES # BLD AUTO: 3.3 K/UL — SIGNIFICANT CHANGE UP (ref 1.2–3.4)
MCHC RBC-ENTMCNC: 28.5 PG — SIGNIFICANT CHANGE UP (ref 27–31)
MCHC RBC-ENTMCNC: 32.7 G/DL — SIGNIFICANT CHANGE UP (ref 32–37)
MCV RBC AUTO: 87.1 FL — SIGNIFICANT CHANGE UP (ref 81–99)
MONOCYTES # BLD AUTO: 1.11 K/UL — HIGH (ref 0.1–0.6)
MONOCYTES NFR BLD AUTO: 9.7 % — HIGH (ref 1.7–9.3)
NEUTROPHILS # BLD AUTO: 6.8 K/UL — HIGH (ref 1.4–6.5)
NEUTROPHILS NFR BLD AUTO: 59.5 % — SIGNIFICANT CHANGE UP (ref 42.2–75.2)
NON HDL CHOLESTEROL: 151 MG/DL — HIGH
NRBC # BLD: 0 /100 WBCS — SIGNIFICANT CHANGE UP (ref 0–0)
PLATELET # BLD AUTO: 316 K/UL — SIGNIFICANT CHANGE UP (ref 130–400)
POTASSIUM SERPL-MCNC: 4.1 MMOL/L — SIGNIFICANT CHANGE UP (ref 3.5–5)
POTASSIUM SERPL-SCNC: 4.1 MMOL/L — SIGNIFICANT CHANGE UP (ref 3.5–5)
PROT SERPL-MCNC: 7.2 G/DL — SIGNIFICANT CHANGE UP (ref 6–8)
RBC # BLD: 4.42 M/UL — SIGNIFICANT CHANGE UP (ref 4.2–5.4)
RBC # FLD: 14.7 % — HIGH (ref 11.5–14.5)
SODIUM SERPL-SCNC: 140 MMOL/L — SIGNIFICANT CHANGE UP (ref 135–146)
TRIGL SERPL-MCNC: 106 MG/DL — SIGNIFICANT CHANGE UP
TSH SERPL-MCNC: 0.52 UIU/ML — SIGNIFICANT CHANGE UP (ref 0.27–4.2)
WBC # BLD: 11.44 K/UL — HIGH (ref 4.8–10.8)
WBC # FLD AUTO: 11.44 K/UL — HIGH (ref 4.8–10.8)

## 2021-05-12 PROCEDURE — 70551 MRI BRAIN STEM W/O DYE: CPT | Mod: 26

## 2021-05-12 PROCEDURE — 99221 1ST HOSP IP/OBS SF/LOW 40: CPT

## 2021-05-12 PROCEDURE — 99233 SBSQ HOSP IP/OBS HIGH 50: CPT

## 2021-05-12 RX ORDER — ACETAMINOPHEN 500 MG
650 TABLET ORAL EVERY 6 HOURS
Refills: 0 | Status: DISCONTINUED | OUTPATIENT
Start: 2021-05-12 | End: 2021-05-13

## 2021-05-12 RX ORDER — ACETAMINOPHEN 500 MG
650 TABLET ORAL ONCE
Refills: 0 | Status: COMPLETED | OUTPATIENT
Start: 2021-05-12 | End: 2021-05-12

## 2021-05-12 RX ADMIN — Medication 650 MILLIGRAM(S): at 06:25

## 2021-05-12 RX ADMIN — Medication 81 MILLIGRAM(S): at 11:31

## 2021-05-12 RX ADMIN — Medication 1 MILLIGRAM(S): at 23:55

## 2021-05-12 RX ADMIN — Medication 1 MILLIGRAM(S): at 17:14

## 2021-05-12 RX ADMIN — Medication 1 MILLIGRAM(S): at 11:31

## 2021-05-12 RX ADMIN — Medication 650 MILLIGRAM(S): at 14:23

## 2021-05-12 RX ADMIN — Medication 650 MILLIGRAM(S): at 14:55

## 2021-05-12 RX ADMIN — Medication 1 MILLIGRAM(S): at 05:47

## 2021-05-12 RX ADMIN — PANTOPRAZOLE SODIUM 40 MILLIGRAM(S): 20 TABLET, DELAYED RELEASE ORAL at 06:25

## 2021-05-12 NOTE — PHYSICAL THERAPY INITIAL EVALUATION ADULT - PERTINENT HX OF CURRENT PROBLEM, REHAB EVAL
pt is a R handed 65 year old woman with PMH of COPD (not on any medication),Active smoker, Stable pulmonary nodules, Anxiety Depression, CVA 5 years ago residula LLE weakness,  presents with increased from residual left sided weakness slurred speech, sudden loss of coordination, fall x 2 episodes with head injury.

## 2021-05-12 NOTE — BEHAVIORAL HEALTH ASSESSMENT NOTE - NSBHCHARTREVIEWVS_PSY_A_CORE FT
ICU Vital Signs Last 24 Hrs  T(C): 35.8 (12 May 2021 13:07), Max: 36.7 (12 May 2021 05:40)  T(F): 96.5 (12 May 2021 13:07), Max: 98 (12 May 2021 05:40)  HR: 92 (12 May 2021 13:07) (73 - 99)  BP: 138/75 (12 May 2021 13:07) (127/80 - 152/78)  BP(mean): --  ABP: --  ABP(mean): --  RR: 19 (12 May 2021 13:07) (18 - 19)  SpO2: 96% (12 May 2021 07:54) (96% - 98%)

## 2021-05-12 NOTE — PHYSICAL THERAPY INITIAL EVALUATION ADULT - ACTIVE RANGE OF MOTION EXAMINATION, REHAB EVAL
B shoulders ~45 degrees flexion AROM, pt declined further ROM 2*to hx of "shoulder problems and carpal tunnel "/bilateral  lower extremity Active ROM was WFL (within functional limits)

## 2021-05-12 NOTE — PHYSICAL THERAPY INITIAL EVALUATION ADULT - SPECIFY REASON(S)
Pt transfers independently, ambulates supervision without a.d., stairs assessed. Pt can ambulate on unit with staff.

## 2021-05-12 NOTE — BEHAVIORAL HEALTH ASSESSMENT NOTE - SUMMARY
Ms. Hoover is  65-year-old female, domiciled in an apartment in a private residence alone, currently on disability, with a past psychiatric history of anxiety, panic attacks, and depression, presently taking Xanax 1 mg 4x/day, who presents s/p fall after taking an unspecified amount (handful) of Xanax as an attempt to fall asleep last night.    current presentation was due to accidental overdose with xanax as patient was trying to have a better sleep. It was not a suicide attempt. Patient has no suicidal ideation currently or in the remote past. She is future oriented and at no distress. There is no active benzo withdrawal and patient is goal directed. While there is a component of recent worsening anxiety, this is situational in the setting of change in life style. Patient has had severe side effect with prior antidepressant. For now we would not recommend to change the dose of xanax given the long history of being on that dose. Patient is advised to  follow up with a psychiatrist for possible change in her regimen. She is scheduled to meet with her psychiatrist on 5/18/21 to discuss this recommendation.    Plan  -No indication for inpatient psychiatry admission  -Recommend to continue Xanax 1mg po four times a day  -Patient is advised to  follow up with a psychiatrist for possible change in her regimen. She is scheduled to meet with her psychiatrist on 5/18/21 to discuss this recommendation. Ms. Hoover is  65-year-old female, domiciled in an apartment in a private residence alone, currently on disability, with a past psychiatric history of anxiety, panic attacks, and depression, presently taking Xanax 1 mg 4x/day, who presents s/p fall after taking an unspecified amount (handful) of Xanax as an attempt to fall asleep last night.    Current presentation was due to accidental overdose with xanax as patient was trying to have a better sleep. It was not a suicide attempt. Patient has no suicidal ideation currently or in the remote past. She is future oriented and at no distress. There is no active benzo withdrawal and patient is goal directed. While there is a component of recent worsening anxiety, this is situational in the setting of change in life style. Patient has had severe side effect with prior antidepressant. For now we would not recommend to change the dose of xanax given the long history of being on that dose. Patient is advised to  follow up with a psychiatrist for possible change in her regimen. She is scheduled to meet with her psychiatrist on 5/18/21 to discuss this recommendation.    Plan  -No indication for inpatient psychiatry admission  --No active sign of benzo withdrawal, Recommend to continue Xanax 1mg po four times a day  -Patient is advised to  follow up with a psychiatrist for possible change in her regimen. She is scheduled to meet with her psychiatrist on 5/18/21 to discuss this recommendation.

## 2021-05-12 NOTE — PROGRESS NOTE ADULT - SUBJECTIVE AND OBJECTIVE BOX
SUBJECTIVE:    Patient is a 65y old Female who presents with a chief complaint of Unwitnessed fall (12 May 2021 07:18)    Currently admitted to medicine with the primary diagnosis of Weakness     Today is hospital day 1d. This morning she is resting comfortably in bed and reports no new issues or overnight events. Pt denies chest pain, palpitations, nausea or vomiting. Pt endorses pain in her left leg and left knee after her fall.     PAST MEDICAL & SURGICAL HISTORY  COPD without exacerbation    Anxiety    Depression    Current smoker      SOCIAL HISTORY:  Negative for smoking/alcohol/drug use.     ALLERGIES:  codeine (Unknown)  contrast media (iodine-based) (Hives)    MEDICATIONS:  STANDING MEDICATIONS  ALPRAZolam 1 milliGRAM(s) Oral four times a day  aspirin  chewable 81 milliGRAM(s) Oral daily  atorvastatin 80 milliGRAM(s) Oral at bedtime  chlorhexidine 4% Liquid 1 Application(s) Topical <User Schedule>  nicotine - 21 mG/24Hr(s) Patch 1 patch Transdermal daily  pantoprazole    Tablet 40 milliGRAM(s) Oral before breakfast    PRN MEDICATIONS    VITALS:   T(F): 98  HR: 73  BP: 130/62  RR: 18  SpO2: 96%    LABS:                        12.6   11.44 )-----------( 316      ( 12 May 2021 06:05 )             38.5     05-12    140  |  105  |  20  ----------------------------<  98  4.1   |  21  |  1.2    Ca    9.6      12 May 2021 06:05    TPro  7.2  /  Alb  4.2  /  TBili  0.3  /  DBili  x   /  AST  27  /  ALT  14  /  AlkPhos  144<H>  05-12    PT/INR - ( 11 May 2021 12:12 )   PT: 12.80 sec;   INR: 1.11 ratio         PTT - ( 11 May 2021 12:12 )  PTT:39.0 sec      Troponin T, Serum: <0.01 ng/mL (05-11-21 @ 12:12)      CARDIAC MARKERS ( 11 May 2021 12:12 )  x     / <0.01 ng/mL / x     / x     / x          RADIOLOGY:    EXAM:  CT ANGIO BRAIN (W)AW IC        EXAM:  CT ANGIO NECK (W)AW IC        EXAM:  CT PERFUSION W MAPS IC        PROCEDURE DATE:  05/11/2021      IMPRESSION:    CT PERFUSION:  Unremarkable.    CTA HEAD/NECK:  No large vessel occlusion, aneurysm, or vascular malformation.  Severe emphysematous changes of the bilateral upper lungs.  Multiple subcentimeter thyroid nodules which can be followed up with nonemergent thyroid ultrasound.      EXAM:  CT BRAIN STROKE PROTOCOL        PROCEDURE DATE:  05/11/2021      IMPRESSION:  No acute intracranial pathology.  Mild chronic microvascular ischemic changes.      PHYSICAL EXAM:  GEN: No acute distress. Bruise on her left side of the head  LUNGS: Clear to auscultation bilaterally   HEART: Regular rate and rhythm   ABD: Soft, non-tender, non-distended.  EXT: bruises on her left leg and right arm. Skin Intact.   NEURO: AAOX3       SUBJECTIVE:    Patient is a 65y old Female who presents with a chief complaint of Unwitnessed fall (12 May 2021 07:18)    Currently admitted to medicine with the primary diagnosis of Weakness     Today is hospital day 1d. This morning she is resting comfortably in bed and reports no new issues or overnight events. Pt denies chest pain, palpitations, nausea or vomiting. Pt endorses pain in her left leg and left knee after her fall.     PAST MEDICAL & SURGICAL HISTORY  COPD without exacerbation    Anxiety    Depression    Current smoker      SOCIAL HISTORY:  Negative for smoking/alcohol/drug use.     ALLERGIES:  codeine (Unknown)  contrast media (iodine-based) (Hives)    MEDICATIONS:  STANDING MEDICATIONS  ALPRAZolam 1 milliGRAM(s) Oral four times a day  aspirin  chewable 81 milliGRAM(s) Oral daily  atorvastatin 80 milliGRAM(s) Oral at bedtime  chlorhexidine 4% Liquid 1 Application(s) Topical <User Schedule>  nicotine - 21 mG/24Hr(s) Patch 1 patch Transdermal daily  pantoprazole    Tablet 40 milliGRAM(s) Oral before breakfast    PRN MEDICATIONS    VITALS:   T(F): 98  HR: 73  BP: 130/62  RR: 18  SpO2: 96%    LABS:                        12.6   11.44 )-----------( 316      ( 12 May 2021 06:05 )             38.5     05-12    140  |  105  |  20  ----------------------------<  98  4.1   |  21  |  1.2    Ca    9.6      12 May 2021 06:05    TPro  7.2  /  Alb  4.2  /  TBili  0.3  /  DBili  x   /  AST  27  /  ALT  14  /  AlkPhos  144<H>  05-12    PT/INR - ( 11 May 2021 12:12 )   PT: 12.80 sec;   INR: 1.11 ratio         PTT - ( 11 May 2021 12:12 )  PTT:39.0 sec      Troponin T, Serum: <0.01 ng/mL (05-11-21 @ 12:12)      CARDIAC MARKERS ( 11 May 2021 12:12 )  x     / <0.01 ng/mL / x     / x     / x          RADIOLOGY:    EXAM:  CT ANGIO BRAIN (W)AW IC        EXAM:  CT ANGIO NECK (W)AW IC        EXAM:  CT PERFUSION W MAPS IC        PROCEDURE DATE:  05/11/2021      IMPRESSION:    CT PERFUSION:  Unremarkable.    CTA HEAD/NECK:  No large vessel occlusion, aneurysm, or vascular malformation.  Severe emphysematous changes of the bilateral upper lungs.  Multiple subcentimeter thyroid nodules which can be followed up with nonemergent thyroid ultrasound.      EXAM:  CT BRAIN STROKE PROTOCOL        PROCEDURE DATE:  05/11/2021      IMPRESSION:  No acute intracranial pathology.  Mild chronic microvascular ischemic changes.      PHYSICAL EXAM:  GEN: No acute distress. Bruise on her left side of the head  LUNGS: Clear to auscultation bilaterally   HEART: Regular rate and rhythm   ABD: Soft, non-tender, non-distended.  EXT: bruises on her R leg and L arm. Skin Intact.   NEURO: AAOX3

## 2021-05-12 NOTE — SWALLOW BEDSIDE ASSESSMENT ADULT - SLP PERTINENT HISTORY OF CURRENT PROBLEM
pt is a 64 y/o F w/ PMHx: stroke (5 years ago w/ residual LLE weakness), COPD, anxiety, depression, pulmonary nodules, p/w increased L-sided weakness, sudden loss of coordination, fall x2 w/ HT. Stroke code on arrival. CTH (-); pt is being treated for unwitnessed fall w/ head trauma in setting of toxic encephalopathy. SLP c/s 2' stroke code. pt is a 66 y/o F w/ PMHx: stroke (5 years ago w/ residual LLE weakness), COPD, anxiety, depression, pulmonary nodules, p/w increased L-sided weakness, sudden loss of coordination, fall x2 w/ HT. Stroke code on arrival. CTH (-); pt is being treated for unwitnessed fall w/ head trauma in setting of toxic encephalopathy. MRI ordered. SLP c/s 2' stroke code.

## 2021-05-12 NOTE — BEHAVIORAL HEALTH ASSESSMENT NOTE - NSBHCHARTREVIEWINVESTIGATE_PSY_A_CORE FT
< from: 12 Lead ECG (05.11.21 @ 12:26) >    Ventricular Rate 79 BPM    Atrial Rate 79 BPM    P-R Interval 136 ms    QRS Duration 84 ms    Q-T Interval 406 ms    QTC Calculation(Bazett) 465 ms    P Axis 79 degrees    R Axis 63 degrees    T Axis 62 degrees    Diagnosis Line Normal sinus rhythm  Normal ECG    Confirmed by BLANKA STANFORD MD ([id number]) on 5/11/2021 1:25:16 PM    < end of copied text >

## 2021-05-12 NOTE — PROGRESS NOTE ADULT - SUBJECTIVE AND OBJECTIVE BOX
LUC HUFFMAN  65y Female    CHIEF COMPLAINT:    Patient is a 65y old  Female who presents with a chief complaint of Unwitnessed fall (11 May 2021 16:01)      INTERVAL HPI/OVERNIGHT EVENTS:    Patient seen and examined.    ROS: All other systems are negative.    Vital Signs:    T(F): 98 (21 @ 05:40), Max: 98.1 (21 @ 11:50)  HR: 73 (21 @ 05:40) (73 - 99)  BP: 130/62 (21 @ 05:40) (127/80 - 152/78)  RR: 18 (21 @ 05:40) (18 - 18)  SpO2: 98% (21 @ 15:00) (96% - 98%)  I&O's Summary    Daily Height in cm: 167.64 (11 May 2021 20:54)    Daily Weight in k (12 May 2021 05:40)  CAPILLARY BLOOD GLUCOSE      POCT Blood Glucose.: 93 mg/dL (11 May 2021 12:12)  POCT Blood Glucose.: 96 mg/dL (11 May 2021 11:39)      PHYSICAL EXAM:    GENERAL:  NAD  SKIN: No rashes or lesions  HENT: Atraumatic. Normocephalic. PERRL. Moist membranes.  NECK: Supple, No JVD. No lymphadenopathy.  PULMONARY: CTA B/L. No wheezing. No rales  CVS: Normal S1, S2. Rate and Rhythm are regular. No murmurs.  ABDOMEN/GI: Soft, Nontender, Nondistended; BS present  EXTREMITIES: Peripheral pulses intact. No edema B/L LE.  NEUROLOGIC:  No motor or sensory deficit.  PSYCH: Alert & oriented x 3    Consultant(s) Notes Reviewed:  [x ] YES  [ ] NO  Care Discussed with Consultants/Other Providers [ x] YES  [ ] NO    EKG reviewed  Telemetry reviewed    LABS:                        12.7   10.06 )-----------( 286      ( 11 May 2021 12:12 )             39.5     -    137  |  102  |  23<H>  ----------------------------<  98  4.5   |  24  |  1.3    Ca    9.8      11 May 2021 12:12    TPro  7.5  /  Alb  4.4  /  TBili  0.2  /  DBili  x   /  AST  31  /  ALT  15  /  AlkPhos  159<H>      PT/INR - ( 11 May 2021 12:12 )   PT: 12.80 sec;   INR: 1.11 ratio         PTT - ( 11 May 2021 12:12 )  PTT:39.0 sec    Trop <0.01, CKMB --, CK --, 21 @ 12:12        RADIOLOGY & ADDITIONAL TESTS:    < from: CT Brain Stroke Protocol (21 @ 11:55) >    IMPRESSION:    No acute intracranial pathology.    Mild chronic microvascular ischemic changes.    < end of copied text >  < from: CT Perfusion w/ Maps w/ IV Cont (21 @ 12:09) >  IMPRESSION:    CT PERFUSION:  Unremarkable.    CTA HEAD/NECK:  No large vessel occlusion, aneurysm, or vascular malformation.    Severe emphysematous changes of the bilateral upper lungs.    < end of copied text >    Imaging or report Personally Reviewed:  [ ] YES  [ ] NO    Medications:  Standing  ALPRAZolam 1 milliGRAM(s) Oral four times a day  aspirin  chewable 81 milliGRAM(s) Oral daily  aspirin  chewable 81 milliGRAM(s) Oral daily  atorvastatin 80 milliGRAM(s) Oral at bedtime  chlorhexidine 4% Liquid 1 Application(s) Topical <User Schedule>  nicotine - 21 mG/24Hr(s) Patch 1 patch Transdermal daily  pantoprazole    Tablet 40 milliGRAM(s) Oral before breakfast    PRN Meds      Case discussed with resident    Care discussed with pt/family           LUC HUFFMAN  65y Female    CHIEF COMPLAINT:    Patient is a 65y old  Female who presents with a chief complaint of Unwitnessed fall (11 May 2021 16:01)      INTERVAL HPI/OVERNIGHT EVENTS:    Patient seen and examined. Denies any increase in LLE weakness. No slurred speech. No dizziness or palpitations.     ROS: All other systems are negative.    Vital Signs:    T(F): 98 (21 @ 05:40), Max: 98.1 (21 @ 11:50)  HR: 73 (21 @ 05:40) (73 - 99)  BP: 130/62 (21 @ 05:40) (127/80 - 152/78)  RR: 18 (21 @ 05:40) (18 - 18)  SpO2: 98% (21 @ 15:00) (96% - 98%)  I&O's Summary    Daily Height in cm: 167.64 (11 May 2021 20:54)    Daily Weight in k (12 May 2021 05:40)  CAPILLARY BLOOD GLUCOSE      POCT Blood Glucose.: 93 mg/dL (11 May 2021 12:12)  POCT Blood Glucose.: 96 mg/dL (11 May 2021 11:39)      PHYSICAL EXAM:    GENERAL:  NAD  SKIN: No rashes or lesions  HENT: Atraumatic. Normocephalic. PERRL. Moist membranes.  NECK: Supple, No JVD. No lymphadenopathy.  PULMONARY: CTA B/L. No wheezing. No rales  CVS: Normal S1, S2. Rate and Rhythm are regular. No murmurs.  ABDOMEN/GI: Soft, Nontender, Nondistended; BS present  EXTREMITIES: Peripheral pulses intact. No edema B/L LE.  NEUROLOGIC:  No motor or sensory deficit.  PSYCH: Alert & oriented x 3    Consultant(s) Notes Reviewed:  [x ] YES  [ ] NO  Care Discussed with Consultants/Other Providers [ x] YES  [ ] NO    EKG reviewed  Telemetry reviewed    LABS:                        12.7   10.06 )-----------( 286      ( 11 May 2021 12:12 )             39.5     05-11    137  |  102  |  23<H>  ----------------------------<  98  4.5   |  24  |  1.3    Ca    9.8      11 May 2021 12:12    TPro  7.5  /  Alb  4.4  /  TBili  0.2  /  DBili  x   /  AST  31  /  ALT  15  /  AlkPhos  159<H>      PT/INR - ( 11 May 2021 12:12 )   PT: 12.80 sec;   INR: 1.11 ratio         PTT - ( 11 May 2021 12:12 )  PTT:39.0 sec    Trop <0.01, CKMB --, CK --, 21 @ 12:12        RADIOLOGY & ADDITIONAL TESTS:    < from: CT Brain Stroke Protocol (21 @ 11:55) >    IMPRESSION:    No acute intracranial pathology.    Mild chronic microvascular ischemic changes.    < end of copied text >  < from: CT Perfusion w/ Maps w/ IV Cont (21 @ 12:09) >  IMPRESSION:    CT PERFUSION:  Unremarkable.    CTA HEAD/NECK:  No large vessel occlusion, aneurysm, or vascular malformation.    Severe emphysematous changes of the bilateral upper lungs.    < end of copied text >    Imaging or report Personally Reviewed:  [ ] YES  [ ] NO    Medications:  Standing  ALPRAZolam 1 milliGRAM(s) Oral four times a day  aspirin  chewable 81 milliGRAM(s) Oral daily  aspirin  chewable 81 milliGRAM(s) Oral daily  atorvastatin 80 milliGRAM(s) Oral at bedtime  chlorhexidine 4% Liquid 1 Application(s) Topical <User Schedule>  nicotine - 21 mG/24Hr(s) Patch 1 patch Transdermal daily  pantoprazole    Tablet 40 milliGRAM(s) Oral before breakfast    PRN Meds      Case discussed with resident    Care discussed with pt/family

## 2021-05-12 NOTE — MEDICAL STUDENT ADULT H&P (EDUCATION) - NS MD HP STUD SOCIAL HX FT
The patient lives alone in an apartment in a private residence.  She does have a neighbor who lives in the apartment a floor beneath her.  She was born and raised in [place].  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  She had dropped out of high school and did not attend college.  She has 2 sisters and 2 brothers, with whom she has a good relationship.  She lost her mother, uncle, and one brother to cancer.  She had been the primary care-taker of her mother, brother, and uncle.  She supports the idea of being the one in the family who takes care of everyone.  She has never been  or had any children due to "just not finding the right person".

## 2021-05-12 NOTE — BEHAVIORAL HEALTH ASSESSMENT NOTE - OTHER PAST PSYCHIATRIC HISTORY (INCLUDE DETAILS REGARDING ONSET, COURSE OF ILLNESS, INPATIENT/OUTPATIENT TREATMENT)
Patient has a history of Depression, Anxiety, and Panic attacks. She has no history of inpatient psychiatry admission  The patient has one prior ED visit for a panic attack and depression in the 1990's.  She stayed in the ED for one night.  She was discharged on Pamelor, which she took for years.  She was later switched to Nefazodone, which she also took for years.  A couple months ago, she was switched to Lexapro.  She states after taking one pill, she suffered severe weakness and fainted.  Her psychiatrist discontinued Lexapro and began Cymbalta.  The patient saw Cymbalta had similar side effects to Lexapro and decided not to take it.  She has an appointment with her psychiatrist on May 18 to discuss other treatment options.

## 2021-05-12 NOTE — PROGRESS NOTE ADULT - ASSESSMENT
65 year old woman with PMH of COPD (not on any medication), Active smoker, Stable pulmonary nodules, Anxiety Depression, CVA 5 years ago residual LLE weakness, presents with increased from residual left sided weakness slurred speech, sudden loss of coordination, fall x 2 episodes with head injury. Stroke code on arrival. NIHSS 5 for generalized weakness and baseline left sided numbness    # Unwitnessed Fall with head Trauma in the setting on Toxic Encephalopathy ( Xanax Overuse) Syncope   - Symptoms of lethargy and slurred speech are like side effects of medication overuse  - Taper Xanax to 1mg q12   - f/u Inpatient Psych    # R/o Acute CVA at baseline Residual LLE Weakness and Gait instability   - CT head -> No acute intracranial pathology.  - f/u MRI and Neurology   - f/u EEG r/u  Seizures   - f/u echo   - ECG NSR  - c/w aspirin  - further recs as per Neuro   - Pt --> patient transfers independently, ambulates supervision without a.d., stairs assessed.    # HO COPD stable with emphysematous changes not on CT imaging   - Patient has regular OP CT  screening scan for subcentimeter nodules     # Active Smoking   - Nicotine Patch   - Smoking cessation encouragement    65 year old woman with PMH of COPD (not on any medication), Active smoker, Stable pulmonary nodules, Anxiety Depression, CVA 5 years ago residual LLE weakness, presents with increased from residual left sided weakness slurred speech, sudden loss of coordination, fall x 2 episodes with head injury. Stroke code on arrival. NIHSS 5 for generalized weakness and baseline left sided numbness    # Unwitnessed Fall with head Trauma in the setting on Toxic Encephalopathy ( Xanax Overuse) Syncope   - Symptoms of lethargy and slurred speech are like side effects of medication overuse  - Taper Xanax to 1mg q12   - f/u Psych eval    # R/o Acute CVA at baseline Residual LLE Weakness and Gait instability   - CT head -> No acute intracranial pathology.  - f/u MRI and Neurology   - f/u EEG r/u  Seizures   - f/u echo   - ECG NSR  - c/w aspirin  - further recs as per Neuro   - Pt --> patient transfers independently, ambulates supervision without a.d., stairs assessed.    # HO COPD stable with emphysematous changes not on CT imaging   - Patient has regular OP CT  screening scan for subcentimeter nodules     # Active Smoking   - Nicotine Patch   - Smoking cessation encouragement     Dispo: acute  Diet: regular   Activity: AAT   DVT Px: lovenox  GI Px: protonix    65 year old woman with PMH of COPD (not on any medication), Active smoker, Stable pulmonary nodules, Anxiety Depression, CVA 5 years ago residual LLE weakness, presents with increased from residual left sided weakness slurred speech, sudden loss of coordination, fall x 2 episodes with head injury. Stroke code on arrival. NIHSS 5 for generalized weakness and baseline left sided numbness    # Unwitnessed Fall with head Trauma in the setting on Toxic Encephalopathy ( Xanax Overuse) Syncope   - Symptoms of lethargy and slurred speech are like side effects of medication overuse  - Taper Xanax to 1mg q12   - f/u Psych eval    # R/o Acute CVA at baseline Residual LLE Weakness and Gait instability   - CT head -> No acute intracranial pathology.  - f/u MRI and Neurology   - f/u EEG r/u  Seizures   - f/u echo   - ECG NSR  - c/w aspirin  - further recs as per Neuro   - Pt --> patient transfers independently, ambulates supervision without a.d., stairs assessed.    # HO COPD stable with emphysematous changes not on CT imaging   - Patient has regular OP CT  screening scan for subcentimeter nodules     # Active Smoking   - Nicotine Patch   - Smoking cessation encouragement     #CKDIII - stable  #Isolated elevated alk phos - monitor  #HLD - on statin     Dispo: acute  Diet: regular   Activity: AAT   DVT Px: lovenox  GI Px: protonix    65 year old woman with PMH of COPD (not on any medication), Active smoker, Stable pulmonary nodules, Anxiety Depression, CVA 5 years ago residual LLE weakness, presents with increased from residual left sided weakness slurred speech, sudden loss of coordination, fall x 2 episodes with head injury. Stroke code on arrival. NIHSS 5 for generalized weakness and baseline left sided numbness    # Unwitnessed Fall with head Trauma in the setting on Toxic Encephalopathy ( Xanax Overuse) Syncope   - Symptoms of lethargy and slurred speech are like side effects of medication overuse  - c/w xanax po 1mg 4 times a day  - f/u Psych eval    # R/o Acute CVA at baseline Residual LLE Weakness and Gait instability   - CT head -> No acute intracranial pathology.  - f/u MRI and Neurology   - f/u EEG r/u  Seizures   - f/u echo   - ECG NSR  - c/w aspirin  - further recs as per Neuro   - Pt --> patient transfers independently, ambulates supervision without a.d., stairs assessed.    # HO COPD stable with emphysematous changes not on CT imaging   - Patient has regular OP CT  screening scan for subcentimeter nodules     # Active Smoking   - Nicotine Patch   - Smoking cessation encouragement     #CKDIII - stable  #Isolated elevated alk phos - monitor  #HLD - on statin     Dispo: acute  Diet: regular   Activity: AAT   DVT Px: lovenox  GI Px: protonix

## 2021-05-12 NOTE — BEHAVIORAL HEALTH ASSESSMENT NOTE - NSBHCHARTREVIEWLAB_PSY_A_CORE FT
12.6   11.44 )-----------( 316      ( 12 May 2021 06:05 )             38.5   05-12    140  |  105  |  20  ----------------------------<  98  4.1   |  21  |  1.2    Ca    9.6      12 May 2021 06:05    TPro  7.2  /  Alb  4.2  /  TBili  0.3  /  DBili  x   /  AST  27  /  ALT  14  /  AlkPhos  144<H>  05-12

## 2021-05-12 NOTE — BEHAVIORAL HEALTH ASSESSMENT NOTE - HPI (INCLUDE ILLNESS QUALITY, SEVERITY, DURATION, TIMING, CONTEXT, MODIFYING FACTORS, ASSOCIATED SIGNS AND SYMPTOMS)
Ms. Hoover is  65-year-old female, domiciled in an apartment in a private residence alone, currently on disability, with a past psychiatric history of anxiety, panic attacks, and depression, presently taking Xanax 1 mg 4x/day, who presents s/p fall after taking an unspecified amount (handful) of Xanax as an attempt to fall asleep last night.      Patient indicated she as feeling very anxious yesterday as a result of all the new stressors to include moving to [place], due to the increasing anxious mood, and her inability to fall sleep she took some extra doses of xanax. This was not an intent to kill herself, but rather in the effort  of falling asleep. She currently does not exhibit any suicidal or homicidal ideation. At the time of the evaluation, there was no overt manic symptoms. And there was no psychosis.    Patient just recently found out she would be moving out of her apartment to [place] on June 1st.  This exacerbated her anxiety and caused a panic attack.  After realizing she was beginning to have a panic attack, the patient took a handful of her Xanax 1 mg pills.  She does not remember how much.  She does not recall falling or anything after taking the pills; however, she was found on the floor, slurring her words, with a bruise on her head.  She was not on the floor for long as her neighbor heard the "bang" of her fall and immediately called the patient's brother.  The patient endorses feelings of worthlessness, anhedonia, decreased appetite, poor concentration, and increased sleep with frequent awakenings throughout the night for longer than the past 2 weeks. She is not currently taking an antidepressant, but has an appointment with her psychiatrist on May 18 to discuss treatment options. The patient also reports feelings of anxiety; flashbacks and nightmares about when she used to take care of her sick mother, brother and uncle; and episodes when she experiences increased energy, sleeplessness for 2 days, and increased irritability.  She denies suicidal/homicidal ideation, intent or plan, any previous suicide attempts, delusions, hearing things that other people cannot, or seeing things other people cannot.    Collateral information obtained from patient's    References: [id number]    04/20/2021	04/30/2021	alprazolam 1 mg tablet	120	30	Qiana Gray MD	Medicare	[id number]  03/17/2021	03/30/2021	alprazolam 1 mg tablet	120	30	Qiana Gray MD	Medicare	[id number]  02/17/2021	03/01/2021	alprazolam 1 mg tablet	120	30	Qiana Gray MD	Medicare	[facility] Ms. Noel is  65-year-old female, domiciled in an apartment in a private residence alone, currently on disability, with a past psychiatric history of anxiety, panic attacks, and depression, presently taking Xanax 1 mg 4x/day, who presents s/p fall after taking an unspecified amount (handful) of Xanax as an attempt to fall asleep last night.      Patient indicated she as feeling very anxious yesterday as a result of all the new stressors to include moving to [place], due to the increasing anxious mood, and her inability to fall sleep she took some extra doses of xanax. This was not an intent to kill herself, but rather in the effort  of falling asleep. She currently does not exhibit any suicidal or homicidal ideation. At the time of the evaluation, there was no overt manic symptoms. And there was no psychosis.    Patient just recently found out she would be moving out of her apartment to [place] on June 1st.  This exacerbated her anxiety and caused a panic attack.  After realizing she was beginning to have a panic attack, the patient took a handful of her Xanax 1 mg pills.  She does not remember how much.  She does not recall falling or anything after taking the pills; however, she was found on the floor, slurring her words, with a bruise on her head.  She was not on the floor for long as her neighbor heard the "bang" of her fall and immediately called the patient's brother.  The patient endorses feelings of worthlessness, anhedonia, decreased appetite, poor concentration, and increased sleep with frequent awakenings throughout the night for longer than the past 2 weeks. She is not currently taking an antidepressant, but has an appointment with her psychiatrist on May 18 to discuss treatment options. The patient also reports feelings of anxiety; flashbacks and nightmares about when she used to take care of her sick mother, brother and uncle; and episodes when she experiences increased energy, sleeplessness for 2 days, and increased irritability.  She denies suicidal/homicidal ideation, intent or plan, any previous suicide attempts, delusions, hearing things that other people cannot, or seeing things other people cannot.    Collateral information obtained from patient's brother, Clark Noel [phone]; noted that patient has been on xanax for sometime and there is no concerns about her safety. He indicated he wished patient was no longer on xanax, but does admit the anxiety has been increasing the past couple of weeks in association with the move to [place]. Patient's entirely family is available to help her and assist patient with the move.       Message left on voicemail for Dr. Qiana Gray  References: [id number]    04/20/2021	04/30/2021	alprazolam 1 mg tablet	120	30	Qiana Gray MD	Medicare	[id number]  03/17/2021	03/30/2021	alprazolam 1 mg tablet	120	30	Qiana Gray MD	Medicare	[id number]  02/17/2021	03/01/2021	alprazolam 1 mg tablet	120	30	Qiana Gray MD	Medicare	[facility] Ms. Noel is  65-year-old female, domiciled in an apartment in a private residence alone, currently on disability, with a past psychiatric history of anxiety, panic attacks, and depression, presently taking Xanax 1 mg 4x/day, who presents s/p fall after taking an unspecified amount of Xanax as an attempt to fall asleep last night.      Patient indicated she as feeling very anxious yesterday as a result of all the new stressors to include moving to [place], due to the increasing anxious mood, and her inability to fall sleep she took some extra doses of xanax. This was not an intent to kill herself, but rather in the effort  of falling asleep. She currently does not exhibit any suicidal or homicidal ideation. At the time of the evaluation, there was no overt manic symptoms. And there was no psychosis.    Patient just recently found out she would be moving out of her apartment to [place] on June 1st.  This exacerbated her anxiety and caused a panic attack.  After realizing she was beginning to have a panic attack, the patient took a handful of her Xanax 1 mg pills.  She does not remember how much.  She does not recall falling or anything after taking the pills; however, she was found on the floor, slurring her words, with a bruise on her head.  She was not on the floor for long as her neighbor heard the "bang" of her fall and immediately called the patient's brother.  The patient endorses feelings of worthlessness, anhedonia, decreased appetite, poor concentration, and increased sleep with frequent awakenings throughout the night for longer than the past 2 weeks. She is not currently taking an antidepressant, but has an appointment with her psychiatrist on May 18 to discuss treatment options. The patient also reports feelings of anxiety; flashbacks and nightmares about when she used to take care of her sick mother, brother and uncle; and episodes when she experiences increased energy, sleeplessness for 2 days, and increased irritability.  She denies suicidal/homicidal ideation, intent or plan, any previous suicide attempts, delusions, hearing things that other people cannot, or seeing things other people cannot.    Collateral information obtained from patient's brother, Clark Noel [phone]; noted that patient has been on xanax for sometime and there is no concerns about her safety. He indicated he wished patient was no longer on xanax, but does admit the anxiety has been increasing the past couple of weeks in association with the move to [place]. Patient's entirely family is available to help her and assist patient with the move.       Message left on voicemail for Dr. Qiana Gray  References: [id number]    04/20/2021	04/30/2021	alprazolam 1 mg tablet	120	30	Qiana Gray MD	Medicare	[id number]  03/17/2021	03/30/2021	alprazolam 1 mg tablet	120	30	Qiana Gray MD	Medicare	[id number]  02/17/2021	03/01/2021	alprazolam 1 mg tablet	120	30	Qiana Gray MD	Medicare	[facility]

## 2021-05-12 NOTE — MEDICAL STUDENT ADULT H&P (EDUCATION) - NS MD HP STUD HX OF PRESENT ILLNESS FT
Source: Patient   Reliability: Good  Attending: Aneesh Douglas  The patient is a single, 65-year-old female, domiciled in an apartment in a private residence alone, currently on disability, with a past psychiatric history of anxiety, panic attacks, and depression, presently taking Xanax 1 mg 4x/day, who presents s/p fall after taking an unspecified amount (handful) of Xanax.  The patient just recently found out she would be moving out of her apartment to [place] on June 1st.  This exacerbated her anxiety and caused a panic attack.  After realizing she was beginning to have a panic attack, the patient took a handful of her Xanax 1 mg pills.  She does not remember how much.  She does not recall falling or anything after taking the pills; however, she was found on the floor, slurring her words, with a bruise on her head.  She was not on the floor for long as her neighbor heard the "bang" of her fall and immediately called the patient's brother.  The patient endorses feelings of worthlessness, anhedonia, decreased appetite, poor concentration, and increased sleep with frequent awakenings throughout the night for longer than the past 2 weeks. She is not currently taking an antidepressant, but has an appointment with her psychiatrist on May 18 to discuss treatment options. The patient also reports feelings of anxiety; flashbacks and nightmares about when she used to take care of her sick mother, brother and uncle; and episodes when she experiences increased energy, sleeplessness for 2 days, and increased irritability.  She denies suicidal/homicidal ideation, intent or plan, any previous suicide attempts, delusions, hearing things that other people cannot, or seeing things other people cannot.    Past Psychiatric History:  Depression, Anxiety, and Panic attacks.  The patient has one previous hospitalization in the ED for a panic attack and depression in the 1990's.  She stayed in the ED for one night.  She was discharged on Pamelor, which she took for years.  She was later switched to Nefazodone, which she also took for years.  A couple months ago, she was switched to Lexapro.  She states after taking one pill, she suffered severe weakness and fainted.  Her psychiatrist discontinued Lexapro and began Cymbalta.  The patient saw Cymbalta had similar side effects to Lexapro and decided not to take it.  She has an appointment with her psychiatrist on May 18 to discuss other treatment options.        Substance Use History:  The patient smokes 1/2-1 pack of cigarettes per day for the past 53 years.  She denies alcohol use.  She admits to trying marijuana once, years ago; however, denies any current use of marijuana, and any current or past use of cocaine, LSD, ecstasy, or any other illicit drugs.    Mental Status Exam:   Appearance: well-appearing, appears stated age, good hygiene and grooming  Behavior: calm, cooperative, good eye contact  Speech: regular volume, rate, and prosody  Reported Mood: "Numb, anxious, and depressed"  Affect: Euthymic, congruent with mood  Thought process: linear, goal-directed  Thought Content: no obsessions, preoccupations, SI/HI, prominent delusions, A/V hallucinations  Perceptions: no auditory or visual hallucinations  Memory: poor memory of recent fall, but overall good recent and remote memory  Orientation: alert and oriented to person, place, time, and situation  Insight: good  Judgement: good

## 2021-05-12 NOTE — MEDICAL STUDENT ADULT H&P (EDUCATION) - NS MD HP STUD ASPLAN ASSES FT
The patient is a single, 65-year-old female, domiciled in an apartment in a private residence alone, currently on disability, with a past psychiatric history of anxiety, panic attacks, and depression, presently taking Xanax 1 mg 4x/day, who presents s/p fall after possible suicide attempt and overdose on Xanax in the context of worsening anxiety, and who is possibly currently experiencing withdrawal symptoms.  Upon examination, the patient denies any suicidal ideation or intent, states reasons for living, and finds meaning in her life and in taking care of others.  She reports that taking more Xanax than prescribed was an attempt to calm down after starting to experience a panic attack and was NOT a suicide attempt.  The patient is not displaying any tremors, irritability, sweating, anxiousness, restlessness, or any other signs of withdrawal; therefore, withdrawal is not a concern at this time. The patient is a single, 65-year-old female, domiciled in an apartment in a private residence alone, currently on disability, with a past psychiatric history of anxiety, panic attacks, and depression, presently taking Xanax 1 mg 4x/day, who presents s/p fall, reportedly after taking extra doses of xanax. Psychiatry is consulted for management of anxiety and concerns for withdrawal symptoms.  Upon examination, the patient denies any suicidal ideation or intent, states reasons for living, and finds meaning in her life and in taking care of others.  She reports that taking more Xanax than prescribed was an attempt to calm down after starting to experience a panic attack and was NOT a suicide attempt.  The patient is not displaying any tremors, irritability, sweating, anxiousness, restlessness, or any other signs of withdrawal; therefore, withdrawal is not a concern at this time.

## 2021-05-12 NOTE — PROGRESS NOTE ADULT - ASSESSMENT
65 year old woman with PMH of COPD (not on any medication),Active smoker, Stable pulmonary nodules, Anxiety Depression, CVA 5 years ago residual LLE weakness,  presents with increased from residual left sided weakness slurred speech, sudden loss of coordination, fall x 2 episodes with head injury. Pt was depressed and took unspecified amount of Xanax pills.     Xanax overdose  Toxic encephalopathy  Unwitnessed fall  COPD  Tobacco use  Anxiety / Depression  H/O CVA with residual L sided weakness            PLAN:    ·	CT head & CTA head/neck are unremarkable.  65 year old woman with PMH of COPD (not on any medication),Active smoker, Stable pulmonary nodules, Anxiety Depression, CVA 5 years ago residual LLE weakness,  presents with increased from residual left sided weakness slurred speech, sudden loss of coordination, fall x 2 episodes with head injury. Pt was depressed and took unspecified amount of Xanax pills.     Xanax overdose  No Toxic encephalopathy  Unwitnessed fall  COPD  Tobacco use  Anxiety / Depression  H/O CVA with residual L sided weakness            PLAN:    ·	CT head & CTA head/neck are unremarkable.   ·	Neuro eval noted. Recommended brain MRI without petra and routine EEG.   ·	Decrease Xanax dose to 1 mg po twice a day  ·	Psych eval. Pt had a recent change in her psych meds.   ·	PT eval  ·	Tobacco cessation.  ·	Cont her other home meds for now    Progress Note Handoff    Pending (specify):  Consults_Psych________, Tests__Brain MRI______, Test Results_______, Other_________  Family discussion:  Disposition: Home___/SNF___/Other________/Unknown at this time________    Denver Ignacio MD  Spectra: [id number]

## 2021-05-12 NOTE — MEDICAL STUDENT ADULT H&P (EDUCATION) - NS MD HP STUD PE VITALS FT
T(C): 36.7 (05-12-21 @ 05:40), Max: 36.7 (05-11-21 @ 11:50)  HR: 73 (05-12-21 @ 05:40) (73 - 99)  BP: 130/62 (05-12-21 @ 05:40) (127/80 - 152/78)  RR: 18 (05-12-21 @ 05:40) (18 - 18)  SpO2: 96% (05-12-21 @ 07:54) (96% - 98%)

## 2021-05-12 NOTE — PHYSICAL THERAPY INITIAL EVALUATION ADULT - GENERAL OBSERVATIONS, REHAB EVAL
8:30-8:57 Pt encountered supine in bed in NAD. + tele. Pt reports hx of CVA with L sided weakness, no c/o offered at this time.

## 2021-05-12 NOTE — MEDICAL STUDENT ADULT H&P (EDUCATION) - NS MD HP STUD PMH FT
COPD  Arthritis  Carpal Tunnel Syndrome   GERD  Bulging, herniated cervical and lumbar disks  S/p stroke neuropathy and weakness in LLE

## 2021-05-12 NOTE — MEDICAL STUDENT ADULT H&P (EDUCATION) - NS MD HP STUD ASPLAN PLAN FT
The patient is not a danger to herself or others, and is currently not experiencing any withdrawal symptoms.  She would not benefit from inpatient hospitalization and can be managed out patient.    #Xanax Overdose - NOT Suicide attempt  - Continue with Xanax 1 mg 4x/day- dosage pt has been taking for years  - No withdrawal symptoms noted at this time  - Continue to observe for withdrawal symptoms     #Depression  - F/u with out patient psychiatrist for antidepressant therapy    #Anxiety   -Continue with Xanax 1mg 4x/day    #S/p fall, +HT in context of Xanax overdose   -F/u MRI of head  -Consult Neurology    #Smoking  -Pt interested in smoking cessation

## 2021-05-12 NOTE — MEDICAL STUDENT ADULT H&P (EDUCATION) - NS MD HP STUD RESULTS LAB FT
12.6   11.44 )-----------( 316      ( 12 May 2021 06:05 )             38.5     05-12  140  |  105  |  20  ----------------------------<  98  4.1   |  21  |  1.2    Ca    9.6      12 May 2021 06:05  TPro  7.2  /  Alb  4.2  /  TBili  0.3  /  DBili  x   /  AST  27  /  ALT  14  /  AlkPhos  144<H>  05-12  POCT Blood Glucose.: 93 mg/dL (11 May 2021 12:12)  LIVER FUNCTIONS - ( 12 May 2021 06:05 )  Alb: 4.2 g/dL / Pro: 7.2 g/dL / ALK PHOS: 144 U/L / ALT: 14 U/L / AST: 27 U/L / GGT: x         PT/INR - ( 11 May 2021 12:12 )   PT: 12.80 sec;   INR: 1.11 ratio    PTT - ( 11 May 2021 12:12 )  PTT:39.0 sec

## 2021-05-12 NOTE — BEHAVIORAL HEALTH ASSESSMENT NOTE - NSBHCONSULTFOLLOWAFTERCARE_PSY_A_CORE FT
Patient will continue follow up with Dr. Neida Kiran   [phone]       located at [address] [place] [address]. Her next appointment is on 5/18/21.

## 2021-05-12 NOTE — PHYSICAL THERAPY INITIAL EVALUATION ADULT - MANUAL MUSCLE TESTING RESULTS, REHAB EVAL
BLE WFL, BUE shoulders 2+/5 in available ROM,  B elbows WFL, L hand  mild decrease - pt reports residual deficit from hx of CVA

## 2021-05-13 ENCOUNTER — TRANSCRIPTION ENCOUNTER (OUTPATIENT)
Age: 66
End: 2021-05-13

## 2021-05-13 VITALS — OXYGEN SATURATION: 96 %

## 2021-05-13 LAB
COVID-19 SPIKE DOMAIN AB INTERP: POSITIVE
COVID-19 SPIKE DOMAIN ANTIBODY RESULT: >250 U/ML — HIGH
SARS-COV-2 IGG+IGM SERPL QL IA: >250 U/ML — HIGH
SARS-COV-2 IGG+IGM SERPL QL IA: POSITIVE

## 2021-05-13 PROCEDURE — 99239 HOSP IP/OBS DSCHRG MGMT >30: CPT

## 2021-05-13 RX ADMIN — Medication 650 MILLIGRAM(S): at 08:29

## 2021-05-13 RX ADMIN — Medication 650 MILLIGRAM(S): at 07:59

## 2021-05-13 RX ADMIN — Medication 1 MILLIGRAM(S): at 11:05

## 2021-05-13 RX ADMIN — CHLORHEXIDINE GLUCONATE 1 APPLICATION(S): 213 SOLUTION TOPICAL at 05:14

## 2021-05-13 RX ADMIN — Medication 1 MILLIGRAM(S): at 05:14

## 2021-05-13 RX ADMIN — PANTOPRAZOLE SODIUM 40 MILLIGRAM(S): 20 TABLET, DELAYED RELEASE ORAL at 05:14

## 2021-05-13 RX ADMIN — Medication 81 MILLIGRAM(S): at 11:05

## 2021-05-13 NOTE — DISCHARGE NOTE PROVIDER - NSDCMRMEDTOKEN_GEN_ALL_CORE_FT
ALPRAZOLAM  1 MG TABS: 1 tab(s) orally 4 times a day  aspirin 81 mg oral tablet, chewable: 1 tab(s) orally once a day  ESOMEPRAZOLE MAGNESIUM  40 MG CPDR: 1 cap(s) orally once a day  FLUTICASONE PROPIONATE  50 MCG/ACT SUSP: 2 gram(s) nasal once a day  NEFAZODONE 200MG TABLETS: TK 1 T PO QAM AND 1 T IN THE [place]

## 2021-05-13 NOTE — PROGRESS NOTE ADULT - SUBJECTIVE AND OBJECTIVE BOX
SUBJECTIVE:    Patient is a 65y old Female who presents with a chief complaint of Unwitnessed fall (12 May 2021 11:36)    Currently admitted to medicine with the primary diagnosis of Weakness    Today is hospital day 2d. This morning she is resting comfortably in bed and reports no new issues or overnight events. Pt denies chest pain, palpitations, nausea or vomiting    PAST MEDICAL & SURGICAL HISTORY  COPD without exacerbation    Anxiety    Depression    Current smoker      SOCIAL HISTORY:  Negative for smoking/alcohol/drug use.     ALLERGIES:  codeine (Unknown)  contrast media (iodine-based) (Hives)    MEDICATIONS:  STANDING MEDICATIONS  ALPRAZolam 1 milliGRAM(s) Oral four times a day  aspirin  chewable 81 milliGRAM(s) Oral daily  atorvastatin 80 milliGRAM(s) Oral at bedtime  chlorhexidine 4% Liquid 1 Application(s) Topical <User Schedule>  nicotine - 21 mG/24Hr(s) Patch 1 patch Transdermal daily  pantoprazole    Tablet 40 milliGRAM(s) Oral before breakfast    PRN MEDICATIONS  acetaminophen   Tablet .. 650 milliGRAM(s) Oral every 6 hours PRN    VITALS:   T(F): 98.3  HR: 82  BP: 135/96  RR: 20  SpO2: 96%    LABS:                        12.6   11.44 )-----------( 316      ( 12 May 2021 06:05 )             38.5     05-12    140  |  105  |  20  ----------------------------<  98  4.1   |  21  |  1.2    Ca    9.6      12 May 2021 06:05    TPro  7.2  /  Alb  4.2  /  TBili  0.3  /  DBili  x   /  AST  27  /  ALT  14  /  AlkPhos  144<H>  05-12    PT/INR - ( 11 May 2021 12:12 )   PT: 12.80 sec;   INR: 1.11 ratio         PTT - ( 11 May 2021 12:12 )  PTT:39.0 sec      CARDIAC MARKERS ( 11 May 2021 12:12 )  x     / <0.01 ng/mL / x     / x     / x          RADIOLOGY:    EXAM:  CT ANGIO BRAIN (W)AW IC        EXAM:  CT ANGIO NECK (W)AW IC        EXAM:  CT PERFUSION W MAPS IC        PROCEDURE DATE:  05/11/2021      CT PERFUSION:  Unremarkable.    CTA HEAD/NECK:  No large vessel occlusion, aneurysm, or vascular malformation.  Severe emphysematous changes of the bilateral upper lungs.  Multiple subcentimeter thyroid nodules which can be followed up with nonemergent thyroid ultrasound.      EXAM:  CT BRAIN STROKE PROTOCOL        PROCEDURE DATE:  05/11/2021    IMPRESSION:  No acute intracranial pathology.  Mild chronic microvascular ischemic changes.      EXAM:  EEG AWAKE AND DROWSY    PROCEDURE DATE:  05/12/2021    Impression: Normal      EXAM:  MR BRAIN        PROCEDURE DATE:  05/12/2021    IMPRESSION:  There is no evidence of acute intracranial pathology. No evidence of acute infarct, intracranial hemorrhage or mass effect.  Punctate chronic lacunar infarct involving the right corona radiata.        PHYSICAL EXAM:  GEN: No acute distress. Bruise on her left side of the head  LUNGS: Clear to auscultation bilaterally   HEART: Regular rate and rhythm   ABD: Soft, non-tender, non-distended.  EXT: bruises on her R leg and L arm. Skin Intact.   NEURO: AAOX3

## 2021-05-13 NOTE — DISCHARGE NOTE PROVIDER - NSDCFUADDINST_GEN_ALL_CORE_FT
Please continue follow up with Dr. Neida Kiran   [phone] located at [address] [place] [address]. Your next appointment is on 5/18/21.

## 2021-05-13 NOTE — DISCHARGE NOTE PROVIDER - CARE PROVIDERS DIRECT ADDRESSES
[email or web address]

## 2021-05-13 NOTE — DISCHARGE NOTE NURSING/CASE MANAGEMENT/SOCIAL WORK - PATIENT PORTAL LINK FT
You can access the FollowMyHealth Patient Portal offered by [facility] by registering at the following website: [email or web address]. By joining [facility]’s FollowMyHealth portal, you will also be able to view your health information using other applications (apps) compatible with our system.

## 2021-05-13 NOTE — DISCHARGE NOTE PROVIDER - CARE PROVIDER_API CALL
Nicolas Watkins)  [address] [place], [place] [address]  [address] [place] [address], [place] [address]  Phone: [phone]  Fax: [phone]  Follow Up Time: Routine

## 2021-05-13 NOTE — DISCHARGE NOTE PROVIDER - NSDCCPCAREPLAN_GEN_ALL_CORE_FT
PRINCIPAL DISCHARGE DIAGNOSIS  Diagnosis: Weakness  Assessment and Plan of Treatment: Your weakness was most likely a result of overuse of xanax. Xanax can cause sleepiness, lethargy and weakness if taken in high doses. Please take your medications as prescribed

## 2021-05-13 NOTE — PROGRESS NOTE ADULT - ASSESSMENT
65 year old woman with PMH of COPD (not on any medication), Active smoker, Stable pulmonary nodules, Anxiety Depression, CVA 5 years ago residual LLE weakness, presents with increased from residual left sided weakness slurred speech, sudden loss of coordination, fall x 2 episodes with head injury. Stroke code on arrival. NIHSS 5 for generalized weakness and baseline left sided numbness    # Unwitnessed Fall with head Trauma in the setting on Toxic Encephalopathy ( Xanax Overuse) Syncope   - Symptoms of lethargy and slurred speech are like side effects of medication overuse  - per psych eval --> c/w xanax po 1mg 4 times a day  - f/u OP with psych      # R/o Acute CVA at baseline Residual LLE Weakness and Gait instability   - CT head shows no acute intracranial pathology  - MRI Head shows no evidence of acute intracranial pathology. No evidence of acute infarct, intracranial hemorrhage or mass effect. Punctate chronic lacunar infarct involving the right corona radiata  - EEG normal  - ECG NSR  - c/w aspirin  - further recs as per Neuro       # HO COPD stable with emphysematous changes not on CT imaging   - Patient has regular OP CT screening scan for subcentimeter nodules     # Active Smoking   - Nicotine Patch   - Smoking cessation encouragement     #CKDIII - stable  #Isolated elevated alk phos - monitor  #HLD - on statin     Diet: regular   Activity: AAT   DVT Px: lovenox  GI Px: protonix    65 year old woman with PMH of COPD (not on any medication), Active smoker, Stable pulmonary nodules, Anxiety Depression, CVA 5 years ago residual LLE weakness, presents with increased from residual left sided weakness slurred speech, sudden loss of coordination, fall x 2 episodes with head injury. Stroke code on arrival. NIHSS 5 for generalized weakness and baseline left sided numbness    # Unwitnessed Fall with head Trauma in the setting on Toxic Encephalopathy ( Xanax Overuse) Syncope   - Symptoms of lethargy and slurred speech are like side effects of medication overuse  - per psych eval --> c/w xanax po 1mg 4 times a day  - f/u OP with psych    # R/o Acute CVA at baseline Residual LLE Weakness and Gait instability   - CT head shows no acute intracranial pathology  - MRI Head shows no evidence of acute intracranial pathology. No evidence of acute infarct, intracranial hemorrhage or mass effect. Punctate chronic lacunar infarct involving the right corona radiata  - EEG normal  - ECG NSR  - c/w aspirin  - further recs as per Neuro     # HO COPD stable with emphysematous changes not on CT imaging   - Patient has regular OP CT screening scan for subcentimeter nodules     # Active Smoking   - Nicotine Patch   - Smoking cessation encouragement     #CKDIII - stable  #Isolated elevated alk phos - monitor  #HLD - on statin     Diet: regular   Activity: AAT   DVT Px: lovenox  GI Px: protonix   Dispo: Stable for discharge home today

## 2021-05-13 NOTE — DISCHARGE NOTE PROVIDER - HOSPITAL COURSE
65 year old woman with PMH of COPD (not on any medication),Active smoker, Stable pulmonary nodules, Anxiety Depression, CVA 5 years ago residula LLE weakness,  presents with increased from residual left sided weakness slurred speech, sudden loss of coordination, fall x 2 episodes with head injury.  As per patient and family  she  recently found out that she  was being moved to another living location and became very depressed and felt the onset of an anxiety attach.  Patient  states that she then took and unspecified about of 1mg xanax pills. This  was followed  by  a sudden felling of lethargy.  She then tried t get out of bed felt really weak and unsteady.  She doesn't remember much after this point. When  her family found her she was slurring her words, very weak  and had a bruise on her head.    Stroke code on arrival. NIHSS 5 for generalized weakness and baseline left sided numbness. Patient Denies  Suicidal Ideation, chest  Pain , Palpitations.     Patient underwent work up for stroke. CTH and mri negative for any acute pathology. EEG normal. Symptoms likely due to xanax overdose. Pt evaluated by psych as well, no suicidal ideation. Her symptoms resolved and she is now medically stable to be discharged home. 65 year old woman with PMH of COPD (not on any medication),Active smoker, Stable pulmonary nodules, Anxiety Depression, CVA 5 years ago residula LLE weakness,  presents with increased from residual left sided weakness slurred speech, sudden loss of coordination, fall x 2 episodes with head injury.  As per patient and family  she  recently found out that she  was being moved to another living location and became very depressed and felt the onset of an anxiety attach.  Patient  states that she then took and unspecified about of 1mg xanax pills. This  was followed  by  a sudden felling of lethargy.  She then tried t get out of bed felt really weak and unsteady.  She doesn't remember much after this point. When  her family found her she was slurring her words, very weak  and had a bruise on her head.    Stroke code on arrival. NIHSS 5 for generalized weakness and baseline left sided numbness. Patient Denies  Suicidal Ideation, chest  Pain , Palpitations.         Patient underwent work up for stroke. CTH and mri negative for any acute pathology. EEG normal. Symptoms likely due to xanax overdose given the suspicion that she took more than prescribed. Pt evaluated by psych as well, no suicidal ideation. Did not want to stop Xanax right away given how long she is has been on med. Recommended outpatient follow up for further observation and GDR. Her symptoms resolved and she is now medically stable to be discharged home. Mentating and ambulating back to baseline without patient follow up.     Vital Signs Last 24 Hrs  T(C): 36.8 (13 May 2021 05:02), Max: 36.8 (13 May 2021 05:02)  T(F): 98.3 (13 May 2021 05:02), Max: 98.3 (13 May 2021 05:02)  HR: 82 (13 May 2021 05:02) (80 - 92)  BP: 135/96 (13 May 2021 05:02) (135/96 - 146/67)  BP(mean): --  RR: 20 (13 May 2021 05:02) (19 - 20)  SpO2: 96% (13 May 2021 08:02) (96% - 96%)    Gen- NAD, non toxic,   Eyes- anicteric sclera, non injected conjunctiva, EOMI  ENT- hearing grossly intact, oropharynx clear, fair dentition  Chest- CTAB, no wheezing, coarse breath sounds, or accessory muscle use  Cardiac- RRR, normal s1s2, no RMG appreciated, no displaced PMI  Abdomen- non distended, soft, non ttp, nabs+  Ext- no clubbing or cyanosis, spontaneously moving all 4 ext, 5/5 strength  Skin- warm, dry, intact  Neuro- AOx3, normal mentation, CN 2-12 grossly intact  Psych- good mood stated, congruent to affect, appropriate speech and responses    MR Head No Cont (05.12.21 @ 19:19)   IMPRESSION:  Thereis no evidence of acute intracranial pathology. No evidence of acute infarct, intracranial hemorrhage or mass effect.  Punctate chronic lacunar infarct involving the right corona radiata.    CT Angio Neck w/ IV Cont (05.11.21 @ 12:19)   IMPRESSION:  CT PERFUSION:  Unremarkable.    CTA HEAD/NECK:  No large vessel occlusion, aneurysm, or vascular malformation.  Severe emphysematous changes of the bilateral upper lungs.  Multiple subcentimeter thyroid nodules which can be followed up with nonemergent thyroid ultrasound.    CT Brain Stroke Protocol (05.11.21 @ 11:55)   IMPRESSION:  No acute intracranial pathology.  Mild chronic microvascular ischemic changes.

## 2021-05-19 DIAGNOSIS — R53.1 WEAKNESS: ICD-10-CM

## 2021-05-19 DIAGNOSIS — N18.30 CHRONIC KIDNEY DISEASE, STAGE 3 UNSPECIFIED: ICD-10-CM

## 2021-05-19 DIAGNOSIS — E83.39 OTHER DISORDERS OF PHOSPHORUS METABOLISM: ICD-10-CM

## 2021-05-19 DIAGNOSIS — I69.954 HEMIPLEGIA AND HEMIPARESIS FOLLOWING UNSPECIFIED CEREBROVASCULAR DISEASE AFFECTING LEFT NON-DOMINANT SIDE: ICD-10-CM

## 2021-05-19 DIAGNOSIS — T42.4X4A POISONING BY BENZODIAZEPINES, UNDETERMINED, INITIAL ENCOUNTER: ICD-10-CM

## 2021-05-19 DIAGNOSIS — J44.9 CHRONIC OBSTRUCTIVE PULMONARY DISEASE, UNSPECIFIED: ICD-10-CM

## 2021-05-19 DIAGNOSIS — F17.210 NICOTINE DEPENDENCE, CIGARETTES, UNCOMPLICATED: ICD-10-CM

## 2021-05-19 DIAGNOSIS — F41.9 ANXIETY DISORDER, UNSPECIFIED: ICD-10-CM

## 2021-05-19 DIAGNOSIS — E78.5 HYPERLIPIDEMIA, UNSPECIFIED: ICD-10-CM

## 2021-05-19 DIAGNOSIS — F32.9 MAJOR DEPRESSIVE DISORDER, SINGLE EPISODE, UNSPECIFIED: ICD-10-CM

## 2021-12-13 ENCOUNTER — NON-APPOINTMENT (OUTPATIENT)
Age: 66
End: 2021-12-13

## 2025-05-15 NOTE — H&P ADULT - REASON FOR ADMISSION
D/C: Order noted for d/c. Pt confirmed d/c paperwork has correct name. Discharge and education instructions reviewed with patient. Teach-back successful.  Pt verbalized understanding and denied questions at this time. No acute distress noted. Patient instructed to follow-up as noted - return to emergency department if symptoms worsen. Patient verbalized understanding. Discharged per EDMD with discharge instructions. Pt discharged to private vehicle. Patient stable upon departure. Thanked patient for [facility] for care. Provider aware of patient pain at time of discharge.    Unwitnessed fall